# Patient Record
Sex: FEMALE | Race: WHITE | Employment: FULL TIME | URBAN - METROPOLITAN AREA
[De-identification: names, ages, dates, MRNs, and addresses within clinical notes are randomized per-mention and may not be internally consistent; named-entity substitution may affect disease eponyms.]

---

## 2022-12-01 ENCOUNTER — OFFICE VISIT (OUTPATIENT)
Dept: ORTHOPEDIC SURGERY | Age: 37
End: 2022-12-01

## 2022-12-01 VITALS — HEIGHT: 72 IN | WEIGHT: 180 LBS | BODY MASS INDEX: 24.38 KG/M2

## 2022-12-01 DIAGNOSIS — M21.852 ACQUIRED DYSPLASIA OF LEFT HIP: ICD-10-CM

## 2022-12-01 DIAGNOSIS — M25.852 HIP IMPINGEMENT SYNDROME, LEFT: ICD-10-CM

## 2022-12-01 DIAGNOSIS — S73.192A TEAR OF LEFT ACETABULAR LABRUM, INITIAL ENCOUNTER: ICD-10-CM

## 2022-12-01 DIAGNOSIS — R52 PAIN: Primary | ICD-10-CM

## 2022-12-01 NOTE — LETTER
[facility]  Surgery Scheduling Form:    22     DEMOGRAPHICS    Patient Name:  Rahul Lee  Patient :  1985   Patient SS#:  [id number]    Patient Phone:  [phone] (home) [phone] (work) Alt. Patient Phone:    Patient Address:  [place] [address]    PCP:  No primary care provider on file. Insurance:  Payor: BCBS / Plan: BCBS - [place] PPO / Product Type: *No Product type* /   Insurance ID Number:  Payer/Plan Subscr  Sex Relation Sub. Ins. ID Effective Group Num   1. [place] 1985 Male Father [id number] 1/1/15 [id number]                                   [address] [address]   2.  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.89 Adult developmental hip dysplasia    Operation: LEFT  51456 Hip Arthroscopy, labral repair  39756 Hip Arthroscopy, osteoplasty acetabulum     Provider:  Ayla Rangel MD    Location:  [facility] INFORMATION    Requested Date:  ***   Requested Time:  ***       Patient Arrival Time:  ***  OR Time Required:  60 Minutes  Admission:  [x]Outpatient   []23 hour  []Same Day Admit:    days  []Inpatient    Anesthesia:  [x]General  []Spinal  []MAC/Sedation  Regional Anesthesia:  []None  []Lumbar Plexus Block  [x]PENG []Femoral  []Adductor canal  []Interscalene Block  []Insert Catheter       EQUIPMENT    Position:  [x]Supine  []Lateral  []Beach-chair  []Prone    OR Bed:  []Regular  [x][place]  []Jose  []Hip gray  []Beach-chair  []Spyder  Radiology:  [x]Large C-arm  []Small C-arm  []Portable X-ray    Instrument Trays:  []General hip set  []Natacha special hip retractors  []KOKI set    Implants:  Edin-Biomet Hip:  []3.5 screws, 4.5 screws, 6.5/7.0 cannulated screws  []Anchors [x]NuCel  []Bone graft  Na Arthroscopy:  [x]Entry kit, cannulas, shaver  [x] 5.5 Desire Frisk  [x]Anchors                                        [x]NanoPass, Slingshot   [x]Wand    Open instruments: []5 mm round long [place] []Aquamantis    Labral Reconstruction: []Fascia Fariha Allograft  []Graft prep stand  KOKI: []Cortical allograft ilium      SUTURE: []#5 Ethibond  []#2 Ethibond  []#2 Quill  []#1 PDS  []#1 Vicryl                   []2-0 Vicryl  []3-0 Monocryl  [x]2-0 Nylon  []3-0 Nylon  []3-0 PDS                    []Dermabond  []Steri-strips (in half)  DRESSING:  []Prineo dermabond  [x]4x4 gauze  [x]ABDs  [x]Tegaderm  BRACE: []Pelvic Binder  []Hip X-ACT  []Knee TROM  []Knee immobilizer                 []Shoulder Immob. (w/abd. pillow)  []Sling  []Ice Unit  []Ace-Wrap                 [x]Crutches / Flakita Guzman      [x]Edin Biomet:  Jason Fat [phone], Drea Alicia. [email or web address]  []Medacta: Hermelindo Irvin [phone], [email or web address]  []Fx Shoulder: Andres Cortes [phone], Alejos Rinne. [email or web address]  [x]Na: Dionte Lomeli [phone], Cierra Ramirez. [email or web address]  []Kelley & Nephew: Jason Ram [phone]      Comments: ***      Adelaide Arteaga MD  [facility]  12/1/2022       4:09 PM EST

## 2022-12-01 NOTE — PROGRESS NOTES
Dr Adelaide Arteaga      Date /Time 12/1/2022       3:58 PM EST  Name Wilian Darling             1985   Location  [facility]  MRN [id number]                Chief Complaint   Patient presents with    Hip Pain     NP L HIP         History of Present Illness    Wilian Darling is a 39 y.o. female who presents with  left hip pain. Sent in consultation by Julian Yates (one of our previous patients). Injury Mechanism:  none. Worker's Comp. & legal issues:   none. Previous Treatments: Ice, Heat, and NSAIDs    Patient presents the office today with a new problem. Patient here with a chief complaint of left hip pain. Patient has had left hip pain for an extended amount of time. No specific injury or trauma. Pain concentrated over the anterior aspect of the hip. She has seen a surgeon at [address] [place] [address] [address] who recommended a femoroplasty and labral repair. She is here for second opinion. She has failed previous 2 injections to the hip. She is a stay-at-home mom. Her  is one of the directors at [employer]. She has failed all conservative treatments including therapy. She also reports using braces most likely for what appears to be hip dysplasia as a kid. She was happy sitting in the W position but was also able to get to a figure-of-four position easily. She remains flexible. Low risk for Beighton score though. Past History  No past medical history on file. No past surgical history on file. No family history on file. Social History     Tobacco Use    Smoking status: Never    Smokeless tobacco: Never   Substance Use Topics    Alcohol use: Yes      Current Outpatient Medications on File Prior to Visit   Medication Sig Dispense Refill    azithromycin (ZITHROMAX) 250 MG tablet       TAMIFLU 75 MG capsule       methylPREDNISolone (MEDROL DOSEPACK) 4 MG tablet Take as directed 21 tablet 0     No current facility-administered medications on file prior to visit.         ASCVD 10-YEAR RISK SCORE  The ASCVD Risk score (Dru AYALA, et al., 2019) failed to calculate for the following reasons: The 2019 ASCVD risk score is only valid for ages 36 to 78   . Review of Systems  10-point ROS is negative other than HPI. Physical Exam  Based off 1997 Exam Criteria  Ht 6' 2\" (1.88 m)   Wt 180 lb (81.6 kg)   BMI 23.11 kg/m²      Constitutional:       General: He is not in acute distress. Appearance: Normal appearance. Cardiovascular:      Rate and Rhythm: Normal rate and regular rhythm. Pulses: Normal pulses. Pulmonary:      Effort: Pulmonary effort is normal. No respiratory distress. Neurological:      Mental Status: He is alert and oriented to person, place, and time. Mental status is at baseline. Musculoskeletal:  Gait:  normal    Skin: Clean and intact.   No open sores or wounds    Lymphatics: No palpable lymph nodes    Spine / Hip Exam:      RIGHT  LEFT    Lumbar Spine Exam  [x] All Neg    [x] All Neg     Straight leg raise  []  []Not tested   []  []Not tested    Clonus  []  []Not tested   []  []Not tested    Pain with motion  []  []Not tested   []  []Not tested    Radiculopathy  []  []Not tested   []  []Not tested    Paraspinal muscle tenderness  [] Paraspinal  []Midline   [] Paraspinal  []Midline   Sensation RIGHT  LEFT    L3  [x] Normal []Decreased    [x] Normal []Decreased   L4  [x] Normal  []Decreased   [x] Normal []Decreased   L5  [x] Normal []Decreased   [x] Normal []Decreased   S1  [x] Normal  []Decreased   [x] Normal []Decreased   Pelvis       Scoliosis  [x] Nml  [] Present     Leg-length discrepency  [x] Equal  [] Right longer   [] Left longer   Range of Motion Active Passive Active Passive   Hip Flexion 120  120    Abduction 50  50    External Rotation @ 90 flex 65  65    Internal Rotation @ 90 flex 20  20           Hip Impingement / Dysplasia  [x] All Neg  [] Not tested   [] All Neg  [] Not tested    Hip impingement test  []  []Not tested   [x]  []Not tested    C-sign  [] []Not tested   [x]  []Not tested    Anterior instability apprehension  []  []Not tested   []  []Not tested    Posterior instability apprehension  []  []Not tested   []  []Not tested    Uncontained Internal rotation  []  []Not tested  []  []Not tested          Abductors  [x] All Neg  [] Not tested   [x] All Neg  [] Not tested    Medius strength  []  []Not tested   []  []Not tested    Minimum strength  []  []Not tested   []  []Not tested    IT band tendonitis  []  []Not tested   []  []Not tested    Trochanteric tenderness  []  []Not tested  []  []Not tested   Sciatic neuropathic pain  []  []Not tested   []  []Not tested           Post-arthroplasty  [] All Neg  [] Not tested   [] All Neg  [] Not tested    Rectus tendonitis  []  []Not tested   []  []Not tested    Iliopsoas tendonitis       Start-up pain  []  []Not tested   []  []Not tested      Her motion remains fairly well-balanced. Low on the Beighton score. Imaging    Left Hip: [facility]  Radiographs: X-rays were ordered and reviewed of the left hip.  3 views. AP pelvis, lateral, and false profile. They demonstrate relative retroversion of the acetabulum. Most likely presence of femoral anteversion with consistent finding present of the lateral femoral head neck offset. I do not believe this is evidence of a cam lesion. She does have small pincer pathology present however. Lateral center edge angle on the AP x-ray demonstrates 27 degrees, anterior center edge is 26 degrees. She does have a positive crossover sign and lateral wall sign indicating retroversion relatively. She also has ischial spine sign indicating the same thing. I have reviewed reports from previous doctor visits from the patient and will review this MRI done independently, the report as well as my own interpretation as part of this visit.       Procedure:  Orders Placed This Encounter   Procedures    XR HIP LEFT (2-3 VIEWS)     Standing Status:   Future     Number of Occurrences:   1     Standing Expiration Date:   11/30/2023       Assessment and Plan  Dirk Keith was seen today for hip pain. Diagnoses and all orders for this visit:    Pain  -     XR HIP LEFT (2-3 VIEWS); Future    Hip impingement syndrome, left    Tear of left acetabular labrum, initial encounter      Patient does have an MRI. Is not available for my review today. I would like to review the MRI but basically believe patient needs an acetabular osteoplasty and labral repair. She will obtain her MRI from [facility] and delivered to the office. I will review it and call the patient. I do not believe she needs a femoroplasty. I would consider periportal technique for her hip scope rather than full capsular exposure. I expressed that the [facility] surgeons are excellent at performing this operation if needed. I am here. I provide guidance if needed. I discussed with Mara Jenkins that her history, symptoms, signs, and imaging are most consistent with labral tear, femoro-acetabular impingement, and hip dysplasia    We reviewed the natural history of these conditions and treatment options ranging from conservative measures (rest, icing, activity modification, physical therapy, pain meds, cortisone injection)  to surgical options. We had a long discussion with the patient about their hip. We discussed surgical and non surgical options. The most important thing is to work to maintain their range of motion. Next they can try medications including tylenol and NSAIDs. They should also ice frequently and avoid activities that make their hip hurt. Cortisone injections are also options when medicine has failed. We finally discussed surgical options including arthroscopic debridement and possible repairs. They should put it off until they can no longer stand the pain and when nothing else has worked. Conservative measures have failed. She is not interested in cortisone injections.   I think she is an appropriate candidate for surgery due to her ongoing symptoms and dysfunction despite conservative measures. The procedure would be left  36479 Hip Arthroscopy, labral repair  44754 Hip Arthroscopy, osteoplasty acetabulum    Additional imaging needed: Need to review MRI which is already been completed. She will send this to us. Perioperative considerations include:  Preop PCP eval .    We reviewed the risks, benefits, alternatives of this approach. We discussed risks including, but not limited to, bleeding, pain, infection, scarring, damage to the neurovascular structures, blood clots, pulmonary embolus, stiffness, implant failure, incomplete relief of pain, and incomplete return of function. We also reviewed the surgical details, expected recovery, and rehabilitation (6-9 months). She expressed understanding and will undergo preoperative medical evaluation and optimization. If she chooses to undergo surgery with us due to the minor changes, we will plan for hopefully sometime in mid January at [facility]  She will send us the disc in the coming days and we will talk to her on the phone. We can go from there. Electronically signed by Iván Jordan MD on 12/1/2022 at 3:58 PM  This dictation was generated by voice recognition computer software. Although all attempts are made to edit the dictation for accuracy, there may be errors in the transcription that are not intended.

## 2023-01-10 ENCOUNTER — TELEPHONE (OUTPATIENT)
Dept: ORTHOPEDIC SURGERY | Age: 38
End: 2023-01-10

## 2023-01-10 ENCOUNTER — OFFICE VISIT (OUTPATIENT)
Dept: ORTHOPEDIC SURGERY | Age: 38
End: 2023-01-10
Payer: COMMERCIAL

## 2023-01-10 VITALS — BODY MASS INDEX: 24.38 KG/M2 | WEIGHT: 180 LBS | HEIGHT: 72 IN

## 2023-01-10 DIAGNOSIS — M25.852 HIP IMPINGEMENT SYNDROME, LEFT: ICD-10-CM

## 2023-01-10 DIAGNOSIS — S73.192A TEAR OF LEFT ACETABULAR LABRUM, INITIAL ENCOUNTER: ICD-10-CM

## 2023-01-10 DIAGNOSIS — M21.852 ACQUIRED DYSPLASIA OF LEFT HIP: ICD-10-CM

## 2023-01-10 DIAGNOSIS — Z01.818 PREOP TESTING: ICD-10-CM

## 2023-01-10 DIAGNOSIS — Z01.818 PREOP TESTING: Primary | ICD-10-CM

## 2023-01-10 LAB
ABO/RH: NORMAL
ALBUMIN SERPL-MCNC: 4.5 G/DL (ref 3.4–5)
ANION GAP SERPL CALCULATED.3IONS-SCNC: 15 MMOL/L (ref 3–16)
ANTIBODY SCREEN: NORMAL
APTT: 30 SEC (ref 23–34.3)
BASOPHILS ABSOLUTE: 0.1 K/UL (ref 0–0.2)
BASOPHILS RELATIVE PERCENT: 1 %
BILIRUBIN URINE: NEGATIVE
BLOOD, URINE: NEGATIVE
BUN BLDV-MCNC: 9 MG/DL (ref 7–20)
CALCIUM SERPL-MCNC: 10.7 MG/DL (ref 8.3–10.6)
CHLORIDE BLD-SCNC: 104 MMOL/L (ref 99–110)
CLARITY: CLEAR
CO2: 22 MMOL/L (ref 21–32)
COLOR: YELLOW
CREAT SERPL-MCNC: 0.7 MG/DL (ref 0.6–1.1)
EOSINOPHILS ABSOLUTE: 0.1 K/UL (ref 0–0.6)
EOSINOPHILS RELATIVE PERCENT: 1.4 %
GFR SERPL CREATININE-BSD FRML MDRD: >60 ML/MIN/{1.73_M2}
GLUCOSE BLD-MCNC: 96 MG/DL (ref 70–99)
GLUCOSE URINE: NEGATIVE MG/DL
HCT VFR BLD CALC: 41.5 % (ref 36–48)
HEMOGLOBIN: 13.9 G/DL (ref 12–16)
INR BLD: 0.97 (ref 0.87–1.14)
KETONES, URINE: NEGATIVE MG/DL
LEUKOCYTE ESTERASE, URINE: NEGATIVE
LYMPHOCYTES ABSOLUTE: 2.2 K/UL (ref 1–5.1)
LYMPHOCYTES RELATIVE PERCENT: 36.5 %
MCH RBC QN AUTO: 28.4 PG (ref 26–34)
MCHC RBC AUTO-ENTMCNC: 33.4 G/DL (ref 31–36)
MCV RBC AUTO: 85.2 FL (ref 80–100)
MICROSCOPIC EXAMINATION: NORMAL
MONOCYTES ABSOLUTE: 0.4 K/UL (ref 0–1.3)
MONOCYTES RELATIVE PERCENT: 6.3 %
NEUTROPHILS ABSOLUTE: 3.3 K/UL (ref 1.7–7.7)
NEUTROPHILS RELATIVE PERCENT: 54.8 %
NITRITE, URINE: NEGATIVE
PDW BLD-RTO: 12.5 % (ref 12.4–15.4)
PH UA: 6.5 (ref 5–8)
PLATELET # BLD: 274 K/UL (ref 135–450)
PMV BLD AUTO: 9.2 FL (ref 5–10.5)
POTASSIUM SERPL-SCNC: 4.1 MMOL/L (ref 3.5–5.1)
PROTEIN UA: NEGATIVE MG/DL
PROTHROMBIN TIME: 12.7 SEC (ref 11.7–14.5)
RBC # BLD: 4.87 M/UL (ref 4–5.2)
SODIUM BLD-SCNC: 141 MMOL/L (ref 136–145)
SPECIFIC GRAVITY UA: 1.01 (ref 1–1.03)
TRANSFERRIN: 254 MG/DL (ref 200–360)
URINE TYPE: NORMAL
UROBILINOGEN, URINE: 0.2 E.U./DL
WBC # BLD: 6.1 K/UL (ref 4–11)

## 2023-01-10 PROCEDURE — 99214 OFFICE O/P EST MOD 30 MIN: CPT | Performed by: ORTHOPAEDIC SURGERY

## 2023-01-10 NOTE — LETTER
[facility]  Surgery Scheduling Form:    01/10/23     DEMOGRAPHICS    Patient Name:  Arelis Johnston  Patient :  1985   Patient SS#:  [id number]    Patient Phone:  [phone] (home) [phone] (work) Alt. Patient Phone:    Patient Address:  [place] [address]    PCP:  No primary care provider on file. Insurance:  Payor: BCBS / Plan: [facility] - [place] PPO / Product Type: *No Product type* /   Insurance ID Number:  Payer/Plan Subscr  Sex Relation Sub. Ins. ID Effective Group Num   1.  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  S73.192A Acetabular labral tear    Operation: LEFT Hip   09136 Hip Arthroscopy, labral repair  91477 Hip Arthroscopy, osteoplasty acetabulum     Provider:  Robles Cardoso MD    Location:  [facility] INFORMATION    Requested Date:  2023   Requested Time:  TBD       Patient Arrival Time:  TBD  OR Time Required:  70 Minutes  Admission:  [x]Outpatient   []23 hour  []Same Day Admit:    days  []Inpatient    Anesthesia:  [x]General  []Spinal  []MAC/Sedation  Regional Anesthesia:  []None  []Lumbar Plexus Block  [x]PENG []Femoral  []Adductor canal  []Interscalene Block  []Insert Catheter       EQUIPMENT    Position:  [x]Supine  []Lateral  []Beach-chair  []Prone    OR Bed:  []Regular  [x][place]  []Jose  []Hip gray  []Beach-chair  []Spyder  Radiology:  [x]Large C-arm  []Small C-arm  []Portable X-ray    Instrument Trays:  []General hip set  []Natacha special hip retractors  []KOKI set    Implants:  Edin-Biomet Hip:  []3.5 screws, 4.5 screws, 6.5/7.0 cannulated screws  []Anchors                                      [x]NuCel  []Bone graft  [place] Arthroscopy:  [x]Entry kit, cannulas, shaver  [x] 5.5 Melissa Emmer  [x]Anchors [x]NanoPass, Slingshot   [x]Wand    Open instruments: []5 mm round long [place] []Aquamantis    Labral Reconstruction: []Fascia Fariha Allograft  []Graft prep stand  KOKI: []Cortical allograft ilium      SUTURE: []#5 Ethibond  []#2 Ethibond  []#2 Quill  []#1 PDS  []#1 Vicryl                   []2-0 Vicryl  []3-0 Monocryl  [x]2-0 Nylon  []3-0 Nylon  []3-0 PDS                    []Dermabond  []Steri-strips (in half)  DRESSING:  []Prineo dermabond  [x]4x4 gauze  [x]ABDs  [x]Tegaderm  BRACE: []Pelvic Binder  []Hip X-ACT  []Knee TROM  []Knee immobilizer                 []Shoulder Immob. (w/abd. pillow)  []Sling  []Ice Unit  []Ace-Wrap                 [x]Crutches / Sherl Maged      [x]Edin [employer]omet:  Leilani Spicer [phone], [email or web address]  []Medacta: Rollo Bijou [phone], [email or web address]  []Fx Shoulder: Linette Zuñiga [phone], Jamal Burnett. [email or web address]  [x][place]: Cara Nam [phone], Alli Gastelum. [email or web address]  []Kelley & Nephew: Karely Gan [phone]      Comments:        Darwin Stock MD  [facility]  1/10/2023       1:02 PM EST
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
## 2023-01-11 LAB
ESTIMATED AVERAGE GLUCOSE: 102.5 MG/DL
HBA1C MFR BLD: 5.2 %
URINE CULTURE, ROUTINE: NORMAL

## 2023-01-12 LAB — MRSA CULTURE ONLY: NORMAL

## 2023-02-01 ENCOUNTER — TELEPHONE (OUTPATIENT)
Dept: ORTHOPEDIC SURGERY | Age: 38
End: 2023-02-01

## 2023-02-01 NOTE — TELEPHONE ENCOUNTER
General Question     Subject: SX L HIP 2/2/23  Patient and /or Facility Request: Ana Laura Samuel  Contact Number: [phone]      PATIENT CALLED IN TO SEE IF SHE CAN SPEAK TO SOMEONE IN THE OFFICE REGARDING HER SX FOR HER L HIP TOMORROW. Magali Beavers PATIENT STATES THAT IT HAVE BEEN MOVED TO FEB 22.2023. .. PATIENT HAS  SET UP FOR TOMORROW WHILE SHE IN SX. .. PLEASE CALL PATIENT BACK AT THE ABOVE NUMBER. Magali Beavers

## 2023-02-01 NOTE — TELEPHONE ENCOUNTER
General Question     Subject: REQUESTING A CALL ON ARRIVAL TIME FOR SX.   Patient: Shawnee Bosworth Number: [phone]

## 2023-02-01 NOTE — TELEPHONE ENCOUNTER
General Question     Subject: Surgery Questions  Patient and /or Facility Request: Josefina Whalen Number: [phone]         Spouse(Claudio) calling regarding Sx information. Request Elizabeth Smith call him back at above tele#.

## 2023-02-06 RX ORDER — PREDNISONE 10 MG/1
TABLET ORAL
Status: ON HOLD | COMMUNITY
Start: 2022-11-30 | End: 2023-02-08 | Stop reason: HOSPADM

## 2023-02-06 RX ORDER — CODEINE PHOSPHATE AND GUAIFENESIN 10; 100 MG/5ML; MG/5ML
SOLUTION ORAL
COMMUNITY
Start: 2022-11-30

## 2023-02-06 RX ORDER — AMOXICILLIN 500 MG/1
CAPSULE ORAL
Status: ON HOLD | COMMUNITY
Start: 2023-01-04 | End: 2023-02-08 | Stop reason: HOSPADM

## 2023-02-06 NOTE — PROGRESS NOTES
Place patient label inside box (if no patient label, complete below)  Name:  :  MR#:     Pura Marie / PROCEDURE  I (we), Michelle [place] (Patient Name) authorize DR. Bhavani Sifuentes (Provider / Berta Shah) and/or such assistants as may be selected by him/her, to perform the following operation/procedure(s): LEFT HIP ARTHROSCOPY, LABRAL REPAIR, OSTEOPLASTY ACETABULUM        Note: If unable to obtain consent prior to an emergent procedure, document the emergent reason in the medical record. This procedure has been explained to my (our) satisfaction and included in the explanation was: The intended benefit, nature, and extent of the procedure to be performed; The significant risks involved and the probability of success; Alternative procedures and methods of treatment; The dangers and probable consequences of such alternatives (including no procedure or treatment); The expected consequences of the procedure on my future health; Whether other qualified individuals would be performing important surgical tasks and/or whether  would be present to advise or support the procedure. I (we) understand that there are other risks of infection and other serious complications in the pre-operative/procedural and postoperative/procedural stages of my (our) care. I (we) have asked all of the questions which I (we) thought were important in deciding whether or not to undergo treatment or diagnosis. These questions have been answered to my (our) satisfaction. I (we) understand that no assurance can be given that the procedure will be a success, and no guarantee or warranty of success has been given to me (us). It has been explained to me (us) that during the course of the operation/procedure, unforeseen conditions may be revealed that necessitate extension of the original procedure(s) or different procedure(s) than those set forth in Paragraph 1.  I (we) authorize and request that the above-named physician, his/her assistants or his/her designees, perform procedures as necessary and desirable if deemed to be in my (our) best interest.     Revised 8/2/2021                                                                          Page 1 of 2       I acknowledge that health care personnel may be observing this procedure for the purpose of medical education or other specified purposes as may be necessary as requested and/or approved by my (our) physician. I (we) consent to the disposal by the hospital Pathologist of the removed tissue, parts or organs in accordance with hospital policy. I do ____ do not ____ consent to the use of a local infiltration pain blocking agent that will be used by my provider/surgical provider to help alleviate pain during my procedure. I do ____ do not ____ consent to an emergent blood transfusion in the case of a life-threatening situation that requires blood components to be administered. This consent is valid for 24 hours from the beginning of the procedure. This patient does ____ or does not ____ currently have a DNR status/order. If DNR order is in place, obtain Addendum to the Surgical Consent for ALL Patients with a DNR Order to address dania-operative status for limited intervention or DNR suspension.      I have read and fully understand the above Consent for Operation/Procedure and that all blanks were completed before I signed the consent.   _____________________________       _____________________      ____/____am/pm  Signature of Patient or legal representative      Printed Name / Relationship            Date / Time   ____________________________       _____________________      ____/____am/pm  Witness to Signature                                    Printed Name                    Date / Time    If patient is unable to sign or is a minor, complete the following)  Patient is a minor, ____ years of age, or unable to sign because:   ______________________________________________________________________________________________    If a phone consent is obtained, consent will be documented by using two health care professionals, each affirming that the consenting party has no questions and gives consent for the procedure discussed with the physician/provider.   _____________________          ____________________       _____/_____am/pm   2nd witness to phone consent        Printed name           Date / Time    Informed Consent:  I have provided the explanation described above in section 1 to the patient and/or legal representative.  I have provided the patient and/or legal representative with an opportunity to ask any questions about the proposed operation/procedure.   ___________________________          ____________________         ____/____am/pm  Provider / Proceduralist                            Printed name            Date / Time  Revised 8/2/2021                                                                      Page 2 of 2

## 2023-02-07 ENCOUNTER — TELEPHONE (OUTPATIENT)
Dept: ORTHOPEDIC SURGERY | Age: 38
End: 2023-02-07

## 2023-02-07 ENCOUNTER — ANESTHESIA EVENT (OUTPATIENT)
Dept: OPERATING ROOM | Age: 38
End: 2023-02-07
Payer: COMMERCIAL

## 2023-02-07 NOTE — TELEPHONE ENCOUNTER
Surgery 02/08/2023 [facility] 2:00 pm   Arrival 11:30 am         LM plz call back to confirm  [phone]      Patient Confirmed      jm

## 2023-02-08 ENCOUNTER — HOSPITAL ENCOUNTER (OUTPATIENT)
Age: 38
Setting detail: OUTPATIENT SURGERY
Discharge: HOME | End: 2023-02-08
Attending: ORTHOPAEDIC SURGERY | Admitting: ORTHOPAEDIC SURGERY
Payer: COMMERCIAL

## 2023-02-08 ENCOUNTER — APPOINTMENT (OUTPATIENT)
Dept: GENERAL RADIOLOGY | Age: 38
End: 2023-02-08
Attending: ORTHOPAEDIC SURGERY
Payer: COMMERCIAL

## 2023-02-08 ENCOUNTER — ANESTHESIA (OUTPATIENT)
Dept: OPERATING ROOM | Age: 38
End: 2023-02-08
Payer: COMMERCIAL

## 2023-02-08 ENCOUNTER — TELEPHONE (OUTPATIENT)
Dept: ORTHOPEDIC SURGERY | Age: 38
End: 2023-02-08

## 2023-02-08 VITALS
HEIGHT: 72 IN | HEART RATE: 94 BPM | TEMPERATURE: 97.7 F | SYSTOLIC BLOOD PRESSURE: 120 MMHG | WEIGHT: 180 LBS | BODY MASS INDEX: 24.38 KG/M2 | OXYGEN SATURATION: 97 % | RESPIRATION RATE: 19 BRPM | DIASTOLIC BLOOD PRESSURE: 81 MMHG

## 2023-02-08 DIAGNOSIS — S73.192A TEAR OF LEFT ACETABULAR LABRUM, INITIAL ENCOUNTER: Primary | ICD-10-CM

## 2023-02-08 LAB — PREGNANCY, URINE: NEGATIVE

## 2023-02-08 PROCEDURE — 84703 CHORIONIC GONADOTROPIN ASSAY: CPT

## 2023-02-08 PROCEDURE — 7100000000 HC PACU RECOVERY - FIRST 15 MIN: Performed by: ORTHOPAEDIC SURGERY

## 2023-02-08 PROCEDURE — 3700000001 HC ADD 15 MINUTES (ANESTHESIA): Performed by: ORTHOPAEDIC SURGERY

## 2023-02-08 PROCEDURE — 6370000000 HC RX 637 (ALT 250 FOR IP): Performed by: ORTHOPAEDIC SURGERY

## 2023-02-08 PROCEDURE — 73502 X-RAY EXAM HIP UNI 2-3 VIEWS: CPT

## 2023-02-08 PROCEDURE — 2500000003 HC RX 250 WO HCPCS: Performed by: NURSE ANESTHETIST, CERTIFIED REGISTERED

## 2023-02-08 PROCEDURE — 3209999900 FLUORO FOR SURGICAL PROCEDURES

## 2023-02-08 PROCEDURE — 7100000011 HC PHASE II RECOVERY - ADDTL 15 MIN: Performed by: ORTHOPAEDIC SURGERY

## 2023-02-08 PROCEDURE — C1713 ANCHOR/SCREW BN/BN,TIS/BN: HCPCS | Performed by: ORTHOPAEDIC SURGERY

## 2023-02-08 PROCEDURE — 6360000002 HC RX W HCPCS: Performed by: PHYSICIAN ASSISTANT

## 2023-02-08 PROCEDURE — 6360000002 HC RX W HCPCS: Performed by: NURSE ANESTHETIST, CERTIFIED REGISTERED

## 2023-02-08 PROCEDURE — 2709999900 HC NON-CHARGEABLE SUPPLY: Performed by: ORTHOPAEDIC SURGERY

## 2023-02-08 PROCEDURE — 7100000010 HC PHASE II RECOVERY - FIRST 15 MIN: Performed by: ORTHOPAEDIC SURGERY

## 2023-02-08 PROCEDURE — 3600000014 HC SURGERY LEVEL 4 ADDTL 15MIN: Performed by: ORTHOPAEDIC SURGERY

## 2023-02-08 PROCEDURE — 2580000003 HC RX 258: Performed by: ANESTHESIOLOGY

## 2023-02-08 PROCEDURE — 3600000004 HC SURGERY LEVEL 4 BASE: Performed by: ORTHOPAEDIC SURGERY

## 2023-02-08 PROCEDURE — 2720000010 HC SURG SUPPLY STERILE: Performed by: ORTHOPAEDIC SURGERY

## 2023-02-08 PROCEDURE — 64447 NJX AA&/STRD FEMORAL NRV IMG: CPT | Performed by: ANESTHESIOLOGY

## 2023-02-08 PROCEDURE — C1889 IMPLANT/INSERT DEVICE, NOC: HCPCS | Performed by: ORTHOPAEDIC SURGERY

## 2023-02-08 PROCEDURE — 6360000002 HC RX W HCPCS: Performed by: ANESTHESIOLOGY

## 2023-02-08 PROCEDURE — 7100000001 HC PACU RECOVERY - ADDTL 15 MIN: Performed by: ORTHOPAEDIC SURGERY

## 2023-02-08 PROCEDURE — 2580000003 HC RX 258: Performed by: PHYSICIAN ASSISTANT

## 2023-02-08 PROCEDURE — 2580000003 HC RX 258: Performed by: NURSE ANESTHETIST, CERTIFIED REGISTERED

## 2023-02-08 PROCEDURE — 3700000000 HC ANESTHESIA ATTENDED CARE: Performed by: ORTHOPAEDIC SURGERY

## 2023-02-08 DEVICE — NANOTACK SUTURE ANCHOR 1.4MM WITH FLEX INSERTER
Type: IMPLANTABLE DEVICE | Site: HIP | Status: FUNCTIONAL
Brand: NANOTACK

## 2023-02-08 DEVICE — GRAFT HUM TISS 2CC PLCNTA MTRX FLOWABLE IMMUNOSUPPRESSIVE W/: Type: IMPLANTABLE DEVICE | Site: HIP | Status: FUNCTIONAL

## 2023-02-08 RX ORDER — LABETALOL HYDROCHLORIDE 5 MG/ML
10 INJECTION, SOLUTION INTRAVENOUS
Status: DISCONTINUED | OUTPATIENT
Start: 2023-02-08 | End: 2023-02-08 | Stop reason: HOSPADM

## 2023-02-08 RX ORDER — SODIUM CHLORIDE 9 MG/ML
25 INJECTION, SOLUTION INTRAVENOUS PRN
Status: DISCONTINUED | OUTPATIENT
Start: 2023-02-08 | End: 2023-02-08 | Stop reason: HOSPADM

## 2023-02-08 RX ORDER — SODIUM CHLORIDE 0.9 % (FLUSH) 0.9 %
5-40 SYRINGE (ML) INJECTION PRN
Status: DISCONTINUED | OUTPATIENT
Start: 2023-02-08 | End: 2023-02-08 | Stop reason: HOSPADM

## 2023-02-08 RX ORDER — SODIUM CHLORIDE 0.9 % (FLUSH) 0.9 %
5-40 SYRINGE (ML) INJECTION EVERY 12 HOURS SCHEDULED
Status: DISCONTINUED | OUTPATIENT
Start: 2023-02-08 | End: 2023-02-08 | Stop reason: HOSPADM

## 2023-02-08 RX ORDER — SODIUM CHLORIDE, SODIUM LACTATE, POTASSIUM CHLORIDE, CALCIUM CHLORIDE 600; 310; 30; 20 MG/100ML; MG/100ML; MG/100ML; MG/100ML
INJECTION, SOLUTION INTRAVENOUS CONTINUOUS
Status: DISCONTINUED | OUTPATIENT
Start: 2023-02-08 | End: 2023-02-08 | Stop reason: HOSPADM

## 2023-02-08 RX ORDER — MIDAZOLAM HYDROCHLORIDE 1 MG/ML
2 INJECTION INTRAMUSCULAR; INTRAVENOUS ONCE
Status: COMPLETED | OUTPATIENT
Start: 2023-02-08 | End: 2023-02-08

## 2023-02-08 RX ORDER — METOCLOPRAMIDE HYDROCHLORIDE 5 MG/ML
10 INJECTION INTRAMUSCULAR; INTRAVENOUS
Status: DISCONTINUED | OUTPATIENT
Start: 2023-02-08 | End: 2023-02-08 | Stop reason: HOSPADM

## 2023-02-08 RX ORDER — METHYLPREDNISOLONE 4 MG/1
TABLET ORAL
Qty: 1 KIT | Refills: 0 | Status: SHIPPED | OUTPATIENT
Start: 2023-02-08

## 2023-02-08 RX ORDER — ROPIVACAINE HYDROCHLORIDE 5 MG/ML
INJECTION, SOLUTION EPIDURAL; INFILTRATION; PERINEURAL
Status: COMPLETED | OUTPATIENT
Start: 2023-02-08 | End: 2023-02-08

## 2023-02-08 RX ORDER — SUCCINYLCHOLINE/SOD CL,ISO/PF 200MG/10ML
SYRINGE (ML) INTRAVENOUS PRN
Status: DISCONTINUED | OUTPATIENT
Start: 2023-02-08 | End: 2023-02-08 | Stop reason: SDUPTHER

## 2023-02-08 RX ORDER — GLYCOPYRROLATE 0.2 MG/ML
INJECTION INTRAMUSCULAR; INTRAVENOUS PRN
Status: DISCONTINUED | OUTPATIENT
Start: 2023-02-08 | End: 2023-02-08 | Stop reason: SDUPTHER

## 2023-02-08 RX ORDER — FENTANYL CITRATE 50 UG/ML
100 INJECTION, SOLUTION INTRAMUSCULAR; INTRAVENOUS ONCE
Status: COMPLETED | OUTPATIENT
Start: 2023-02-08 | End: 2023-02-08

## 2023-02-08 RX ORDER — ROCURONIUM BROMIDE 10 MG/ML
INJECTION, SOLUTION INTRAVENOUS PRN
Status: DISCONTINUED | OUTPATIENT
Start: 2023-02-08 | End: 2023-02-08 | Stop reason: SDUPTHER

## 2023-02-08 RX ORDER — MELOXICAM 15 MG/1
15 TABLET ORAL DAILY
Qty: 30 TABLET | Refills: 0 | Status: SHIPPED | OUTPATIENT
Start: 2023-02-08

## 2023-02-08 RX ORDER — LIDOCAINE HYDROCHLORIDE 20 MG/ML
INJECTION, SOLUTION INTRAVENOUS PRN
Status: DISCONTINUED | OUTPATIENT
Start: 2023-02-08 | End: 2023-02-08 | Stop reason: SDUPTHER

## 2023-02-08 RX ORDER — ONDANSETRON 4 MG/1
4 TABLET, FILM COATED ORAL 3 TIMES DAILY PRN
Qty: 15 TABLET | Refills: 0 | Status: SHIPPED | OUTPATIENT
Start: 2023-02-08

## 2023-02-08 RX ORDER — ROPIVACAINE HYDROCHLORIDE 5 MG/ML
30 INJECTION, SOLUTION EPIDURAL; INFILTRATION; PERINEURAL ONCE
Status: DISCONTINUED | OUTPATIENT
Start: 2023-02-08 | End: 2023-02-08 | Stop reason: HOSPADM

## 2023-02-08 RX ORDER — HYDRALAZINE HYDROCHLORIDE 20 MG/ML
10 INJECTION INTRAMUSCULAR; INTRAVENOUS
Status: DISCONTINUED | OUTPATIENT
Start: 2023-02-08 | End: 2023-02-08 | Stop reason: HOSPADM

## 2023-02-08 RX ORDER — OXYCODONE HYDROCHLORIDE 5 MG/1
5 TABLET ORAL ONCE
Status: COMPLETED | OUTPATIENT
Start: 2023-02-08 | End: 2023-02-08

## 2023-02-08 RX ORDER — MEPERIDINE HYDROCHLORIDE 25 MG/ML
12.5 INJECTION INTRAMUSCULAR; INTRAVENOUS; SUBCUTANEOUS EVERY 5 MIN PRN
Status: DISCONTINUED | OUTPATIENT
Start: 2023-02-08 | End: 2023-02-08 | Stop reason: HOSPADM

## 2023-02-08 RX ORDER — DIPHENHYDRAMINE HYDROCHLORIDE 50 MG/ML
12.5 INJECTION INTRAMUSCULAR; INTRAVENOUS
Status: DISCONTINUED | OUTPATIENT
Start: 2023-02-08 | End: 2023-02-08 | Stop reason: HOSPADM

## 2023-02-08 RX ORDER — HYDROMORPHONE HCL 110MG/55ML
PATIENT CONTROLLED ANALGESIA SYRINGE INTRAVENOUS PRN
Status: DISCONTINUED | OUTPATIENT
Start: 2023-02-08 | End: 2023-02-08 | Stop reason: SDUPTHER

## 2023-02-08 RX ORDER — DEXAMETHASONE SODIUM PHOSPHATE 4 MG/ML
INJECTION, SOLUTION INTRA-ARTICULAR; INTRALESIONAL; INTRAMUSCULAR; INTRAVENOUS; SOFT TISSUE PRN
Status: DISCONTINUED | OUTPATIENT
Start: 2023-02-08 | End: 2023-02-08 | Stop reason: SDUPTHER

## 2023-02-08 RX ORDER — ASPIRIN 81 MG/1
81 TABLET ORAL 2 TIMES DAILY
Qty: 60 TABLET | Refills: 0 | Status: SHIPPED | OUTPATIENT
Start: 2023-02-09

## 2023-02-08 RX ORDER — CYCLOBENZAPRINE HCL 10 MG
10 TABLET ORAL 3 TIMES DAILY PRN
Qty: 30 TABLET | Refills: 0 | Status: SHIPPED | OUTPATIENT
Start: 2023-02-08 | End: 2023-02-18

## 2023-02-08 RX ORDER — SODIUM CHLORIDE 9 MG/ML
INJECTION, SOLUTION INTRAVENOUS PRN
Status: DISCONTINUED | OUTPATIENT
Start: 2023-02-08 | End: 2023-02-08 | Stop reason: HOSPADM

## 2023-02-08 RX ORDER — PROPOFOL 10 MG/ML
INJECTION, EMULSION INTRAVENOUS PRN
Status: DISCONTINUED | OUTPATIENT
Start: 2023-02-08 | End: 2023-02-08 | Stop reason: SDUPTHER

## 2023-02-08 RX ORDER — ONDANSETRON 2 MG/ML
INJECTION INTRAMUSCULAR; INTRAVENOUS PRN
Status: DISCONTINUED | OUTPATIENT
Start: 2023-02-08 | End: 2023-02-08 | Stop reason: SDUPTHER

## 2023-02-08 RX ORDER — OXYCODONE HYDROCHLORIDE 5 MG/1
5 TABLET ORAL EVERY 6 HOURS PRN
Qty: 28 TABLET | Refills: 0 | Status: SHIPPED | OUTPATIENT
Start: 2023-02-08 | End: 2023-02-15

## 2023-02-08 RX ORDER — KETAMINE HCL IN NACL, ISO-OSM 20 MG/2 ML
SYRINGE (ML) INJECTION PRN
Status: DISCONTINUED | OUTPATIENT
Start: 2023-02-08 | End: 2023-02-08 | Stop reason: SDUPTHER

## 2023-02-08 RX ADMIN — MIDAZOLAM HYDROCHLORIDE 2 MG: 2 INJECTION, SOLUTION INTRAMUSCULAR; INTRAVENOUS at 12:22

## 2023-02-08 RX ADMIN — ONDANSETRON 4 MG: 2 INJECTION INTRAMUSCULAR; INTRAVENOUS at 16:27

## 2023-02-08 RX ADMIN — SUGAMMADEX 200 MG: 100 INJECTION, SOLUTION INTRAVENOUS at 16:30

## 2023-02-08 RX ADMIN — DEXAMETHASONE SODIUM PHOSPHATE 8 MG: 4 INJECTION, SOLUTION INTRAMUSCULAR; INTRAVENOUS at 14:44

## 2023-02-08 RX ADMIN — LIDOCAINE HYDROCHLORIDE 100 MG: 20 INJECTION, SOLUTION INTRAVENOUS at 14:28

## 2023-02-08 RX ADMIN — ROCURONIUM BROMIDE 5 MG: 10 INJECTION INTRAVENOUS at 14:28

## 2023-02-08 RX ADMIN — PHENYLEPHRINE HYDROCHLORIDE 15 MCG/MIN: 10 INJECTION INTRAVENOUS at 15:54

## 2023-02-08 RX ADMIN — SODIUM CHLORIDE, SODIUM LACTATE, POTASSIUM CHLORIDE, AND CALCIUM CHLORIDE: .6; .31; .03; .02 INJECTION, SOLUTION INTRAVENOUS at 15:08

## 2023-02-08 RX ADMIN — ROPIVACAINE HYDROCHLORIDE 30 ML: 5 INJECTION, SOLUTION EPIDURAL; INFILTRATION; PERINEURAL at 12:26

## 2023-02-08 RX ADMIN — FENTANYL CITRATE 100 MCG: 50 INJECTION INTRAMUSCULAR; INTRAVENOUS at 15:05

## 2023-02-08 RX ADMIN — CEFAZOLIN 2000 MG: 2 INJECTION, POWDER, FOR SOLUTION INTRAMUSCULAR; INTRAVENOUS at 14:19

## 2023-02-08 RX ADMIN — HYDROMORPHONE HYDROCHLORIDE 2 MG: 2 INJECTION, SOLUTION INTRAMUSCULAR; INTRAVENOUS; SUBCUTANEOUS at 16:30

## 2023-02-08 RX ADMIN — GLYCOPYRROLATE 0.2 MG: 0.2 INJECTION INTRAMUSCULAR; INTRAVENOUS at 14:50

## 2023-02-08 RX ADMIN — FENTANYL CITRATE 100 MCG: 50 INJECTION INTRAMUSCULAR; INTRAVENOUS at 12:22

## 2023-02-08 RX ADMIN — PROPOFOL 200 MG: 10 INJECTION, EMULSION INTRAVENOUS at 14:28

## 2023-02-08 RX ADMIN — Medication 140 MG: at 14:29

## 2023-02-08 RX ADMIN — ROCURONIUM BROMIDE 45 MG: 10 INJECTION INTRAVENOUS at 15:05

## 2023-02-08 RX ADMIN — OXYCODONE 5 MG: 5 TABLET ORAL at 18:27

## 2023-02-08 RX ADMIN — Medication 20 MG: at 15:08

## 2023-02-08 RX ADMIN — PHENYLEPHRINE HYDROCHLORIDE 100 MCG: 10 INJECTION, SOLUTION INTRAMUSCULAR; INTRAVENOUS; SUBCUTANEOUS at 15:43

## 2023-02-08 RX ADMIN — SODIUM CHLORIDE, SODIUM LACTATE, POTASSIUM CHLORIDE, AND CALCIUM CHLORIDE: .6; .31; .03; .02 INJECTION, SOLUTION INTRAVENOUS at 13:15

## 2023-02-08 ASSESSMENT — PAIN DESCRIPTION - DESCRIPTORS
DESCRIPTORS: ACHING

## 2023-02-08 ASSESSMENT — PAIN DESCRIPTION - ORIENTATION
ORIENTATION: LEFT
ORIENTATION: LEFT

## 2023-02-08 ASSESSMENT — PAIN SCALES - GENERAL
PAINLEVEL_OUTOF10: 3
PAINLEVEL_OUTOF10: 4
PAINLEVEL_OUTOF10: 0
PAINLEVEL_OUTOF10: 0

## 2023-02-08 ASSESSMENT — PAIN DESCRIPTION - PAIN TYPE
TYPE: SURGICAL PAIN
TYPE: SURGICAL PAIN

## 2023-02-08 ASSESSMENT — PAIN DESCRIPTION - LOCATION
LOCATION: HIP
LOCATION: HIP

## 2023-02-08 ASSESSMENT — PAIN - FUNCTIONAL ASSESSMENT: PAIN_FUNCTIONAL_ASSESSMENT: 0-10

## 2023-02-08 NOTE — ANESTHESIA POSTPROCEDURE EVALUATION
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     Mata Phase I: Mata Score: 9    Mata Phase II:        Anesthesia Post Evaluation    Patient location during evaluation: PACU  Patient participation: complete - patient participated  Level of consciousness: awake and alert  Airway patency: patent  Nausea & Vomiting: no nausea and no vomiting  Complications: no  Cardiovascular status: hemodynamically stable  Respiratory status: acceptable  Hydration status: euvolemic  Multimodal analgesia pain management approach
18

## 2023-02-08 NOTE — H&P
Update History & Physical     The patient's History and Physical of 1/24/2023 was reviewed with the patient and I examined the patient. There was no change. The surgical site was confirmed by the patient and me. Plan: The risks, benefits, expected outcome, and alternative to the recommended procedure have been discussed with the patient / family. Patient understands and wants to proceed with the procedure.       Electronically signed by Fiona Velez MD on 2/8/2023 at 12:53 PM

## 2023-02-08 NOTE — PROGRESS NOTES
Instructions given to mother who verbalized understanding,  Mother already picked up prescriptions in [facility] outpatient pharmacy.

## 2023-02-08 NOTE — PROGRESS NOTES
From OR arousable but very drowsy, denies any pain at this time, dressing CDI, ice pack applied, VSS. Report from [facility] and [facility].     S/P LEFT HIP ARTHROSCOPY LABRAL REPAIR

## 2023-02-08 NOTE — ANESTHESIA PROCEDURE NOTES
Peripheral Block    Patient location during procedure: pre-op  Reason for block: post-op pain management and at surgeon's request  Start time: 2/8/2023 12:21 PM  End time: 2/8/2023 12:28 PM  Staffing  Performed: anesthesiologist   Anesthesiologist: Ale Christian MD  Preanesthetic Checklist  Completed: patient identified, IV checked, site marked, risks and benefits discussed, surgical/procedural consents, equipment checked, pre-op evaluation, timeout performed, anesthesia consent given, oxygen available and monitors applied/VS acknowledged  Peripheral Block   Patient position: supine  Prep: ChloraPrep  Provider prep: mask and sterile gloves  Patient monitoring: cardiac monitor, continuous pulse ox, continuous capnometry, frequent blood pressure checks, IV access, oxygen and responsive to questions  Block type: PENG  Laterality: left  Injection technique: single-shot  Guidance: ultrasound guided    Needle   Needle type: insulated echogenic nerve stimulator needle   Needle gauge: 22 G  Needle length: 8 cm  Assessment   Injection assessment: negative aspiration for heme, no paresthesia on injection and no intravascular symptoms  Paresthesia pain: none  Slow fractionated injection: yes  Hemodynamics: stable  Real-time US image taken/store: yes  Outcomes: uncomplicated and patient tolerated procedure well    Additional Notes  Ultrasound-Guided Left PENG Block Note     Indication: Postoperative analgesia upon request of the attending surgeon. Procedure: Informed consent obtained and timeout procedure performed. Patient supine, landmarks identified, sterile prep. Under direct ultrasound visualization a 22G 80mm insulated regional block needle was inserted medial to the anterior superior iliac spine and directed in a medial fashion toward the psoas tendon. Ropivacaine 0.5%-30cc was injected around the psoas tendon in 5cc increments as a PENG block.   Block tolerated well; there were no apparent complications at the time of the procedure. Medications Administered  ropivacaine (NAROPIN) injection 0.5% - Perineural   30 mL - 2/8/2023 12:26:00 PM      Luis Perez.  Jori Yates MD  February 8, 2023 12:37 PM

## 2023-02-08 NOTE — OP NOTE
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 Item Serial No.  Lot No. LRB No. Used Action   GRAFT HUM TISS 2CC PLCNTA MTRX FLOWABLE IMMUNOSUPPRESSIVE W/ - [id number]  GRAFT HUM TISS 2CC PLCNTA MTRX FLOWABLE IMMUNOSUPPRESSIVE W/ [id number] [facility] [employer] [facility]- [id number] Left 1 Implanted       Findings:  1. Displaced labral tear, measuring 40 mm in the heath-superior region of the acetabulum. 2.  No evidence of bony impingement, did not identify an overarching pincer lesion  3. Large chondral wave sign present on the acetabulum with grade 2 to grade 3 chondral damage at the superior lateral rim  4. A very large tear of the labrum present, near total labral injury, greater than 50% of the socket itself. Indications: Kar Coyne has left hip labral tear. She has a slightly retroverted acetabulum but is quite active. She was found to have a labral tear along with bony symptoms that were consistent with impingement. But she also had an element of microinstability and femoral dysplasia. We had a lengthy discussion and work-up prior to surgery.   I believe that because of her microinstability that a full capsular exposure and femoroplasty was really not necessary. I felt the periportal technique was reasonable and repairing the labrum itself is large tear was suspected. She failed physical therapy, over-the-counter medications, cortisone injections. She had significant discomfort in her activities of daily living. Therefore she elected to undergo surgical repair. She understood the risk benefits and alternatives of wanted to proceed. Procedure Details:  I marked the LEFT hip as the operative site in the preoperative area. Regional anesthesia was then induced without complications by anesthesia providers. Patient was wheeled back to the operating theater laid supine on the table. General anesthesia was induced without problems. Patient was transferred to the formerly [facility] table. Large foam post was placed in the groin. The feet were securely fastened to the boots. All bony prominences well-padded. Operative side arm was secured across the body. Timeout was performed confirming the site and type of surgery. Appropriate antibiotics were given within 60 minutes of incision. We began the procedure. Diagnostic Hip Arthroscopy  I first dialed out traction. Overall time of traction was 70 minutes. I cannulated into the joint at Anterolateral portal using a spinal needle followed by Nitinol wire then sequential dilation. Camera was then inserted after cannula. Then I triangulated another spinal needle at Mid-anterio-lateral portal into the anterior triangle through the capsule. Fluoroscopic guidance here was used. I dilated once more over Nitinol wire to get my second portal in position. From here arthroscopic instruments were used and water insufflation was turned on. Water pump pressure was kept at around 35 to 40 mmHg. I first performed a diagnostic arthroscopy. The above findings were noted.  From here we used an oscillating shaver to debride off the labral edge as well as the chondral damage. I then used a high-frequency ablator device to tease off the superior capsule above the labrum. Only a small periportal window was created around each portal, just the width of the cannula itself. Labral repair  I created a small bony bed healthy enough to heal the labrum. A very large tear of the labrum was identified. There was a large labrum itself, with significant delamination into the cartilage. A large chondral flap was contiguous with the labrum and was  away. Nanotak anchors were used. A series of anchors were inserted. A sharp [place] passer was used to pass 1 limb of the suture through the labrum itself. The labral fragment was quite large as already mentioned. This was well stabilized using 4 anchors and the steps were repeated as such. We had to switch camera portals to permit anchor placement via just the periportal technique. Assessment, amniotic fluid injection, closure  I then let traction down and confirmed congruent arc of the femoral head. I then used fluoroscopic guidance once more to inject amniotic fluid into the anterior capsule and intra-articularly. This will help to reduce scarring which would otherwise occur after an operation such as this. Only a small anterior capsulotomy had been performed therefore no repair was required. I closed the incisional portals with interrupted 2-0 nylon sutures. Nonadhesive dressing was then applied. Patient was reversed from general anesthesia transferred back to the PACU without complications. All counts correct x2. I was present throughout the entire to the case. Modifier 22: Increased time and complexity  This surgery took 50% more time than the standard labral repair. The size of this labral tear was quite large as it exceeded 3 cm. It was greater than 50% of the socket itself. Significant microinstability was suspected prior to surgery, therefore.   Portal technique without actual capsule exposure was performed, which is significantly more challenging and less invasive compared to the usual hip arthroscopy exposure, especially in setting of a large tear. This, in no way, signifies that the ultimate outcome is compromised in any way.       Postoperative Plan:  50% WB for 2 weeks with support device  WBAT after 2 weeks  Aspirin 81 mg bid  Ice man unit + CPM machine use x 3 weeks  Oxy outpatient if needed  F/u 2 weeks    Electronically signed by Luis Villaseñor MD on 2/8/2023 at 5:36 PM

## 2023-02-08 NOTE — ANESTHESIA PRE PROCEDURE
Department of Anesthesiology  Preprocedure Note       Name:  Chance Iniguez   Age:  44 y.o.  :  1985                                          MRN:  [id number]         Date:  2023      Surgeon: Ginna Su): Susan Manuel MD    Procedure: Procedure(s):  LEFT HIP ARTHROSCOPY, LABRAL REPAIR, OSTEOPLASTY ACETABULUM    Medications prior to admission:   Prior to Admission medications    Medication Sig Start Date End Date Taking? Authorizing Provider   aspirin EC 81 MG EC tablet Take 1 tablet by mouth 2 times daily 23  Yes Ascencion Hendricks PA-C   cyclobenzaprine (FLEXERIL) 10 MG tablet Take 1 tablet by mouth 3 times daily as needed for Muscle spasms 23 Yes Ascencion Hendricks PA-C   methylPREDNISolone (MEDROL, JOAN,) 4 MG tablet Take by mouth. 23  Yes Ascencion Hendricks PA-C   meloxicam (MOBIC) 15 MG tablet Take 1 tablet by mouth daily Start after medrol completed 23  Yes Ascencion Hendricks PA-C   ondansetron [facility]) 4 MG tablet Take 1 tablet by mouth 3 times daily as needed for Nausea or Vomiting 23  Yes Ascencion Hendricks PA-C   oxyCODONE (ROXICODONE) 5 MG immediate release tablet Take 1 tablet by mouth every 6 hours as needed for Pain for up to 7 days. Intended supply: 7 days.  Take lowest dose possible to manage pain Max Daily Amount: 20 mg 2/8/23 2/15/23 Yes Ascencion Hendricks PA-C   guaiFENesin-codeine (GUAIFENESIN AC) 100-10 MG/5ML liquid  22   Historical Provider, MD       Current medications:    Current Facility-Administered Medications   Medication Dose Route Frequency Provider Last Rate Last Admin    lactated ringers IV soln infusion   IntraVENous Continuous Rebecca Moffett MD        sodium chloride flush 0.9 % injection 5-40 mL  5-40 mL IntraVENous 2 times per day Ascencion Hendricks PA-C        sodium chloride flush 0.9 % injection 5-40 mL  5-40 mL IntraVENous PRN Ascencion Hendricks PA-C        0.9 % sodium chloride infusion   IntraVENous PRN Ascencion Hendricks PA-C        ceFAZolin (ANCEF) 2,000 mg in sodium chloride 0.9 % 50 mL IVPB (mini-bag)  2,000 mg IntraVENous On Call to [address] [employer] [facility]        midazolam (VERSED) injection 2 mg  2 mg IntraVENous Once Yudith Smith MD        fentaNYL (SUBLIMAZE) injection 100 mcg  100 mcg IntraVENous Once Yudith Smith MD        ropivacaine (NAROPIN) 0.5% injection 30 mL  30 mL Infiltration Once Yudith Smith MD           Allergies:  No Known Allergies    Problem List:    Patient Active Problem List   Diagnosis Code    Labral tear of left hip joint N98.543U       Past Medical History:  History reviewed. No pertinent past medical history. Past Surgical History:  History reviewed. No pertinent surgical history. Social History:    Social History     Tobacco Use    Smoking status: Never    Smokeless tobacco: Never   Substance Use Topics    Alcohol use: Yes     Comment: 1                                Counseling given: Not Answered      Vital Signs (Current):   Vitals:    02/06/23 1239 02/08/23 1116   BP:  122/79   Pulse:  58   Resp:  16   Temp:  98.3 °F (36.8 °C)   TempSrc:  Temporal   SpO2:  100%   Weight: 180 lb (81.6 kg) 180 lb (81.6 kg)   Height: 6' 2\" (1.88 m) 6' 2\" (1.88 m)                                              BP Readings from Last 3 Encounters:   02/08/23 122/79   01/16/15 125/65       NPO Status: Time of last liquid consumption: 2100                        Time of last solid consumption: 2100                        Date of last liquid consumption: 02/07/23                        Date of last solid food consumption: 02/07/23    BMI:   Wt Readings from Last 3 Encounters:   02/08/23 180 lb (81.6 kg)   01/10/23 180 lb (81.6 kg)   12/01/22 180 lb (81.6 kg)     Body mass index is 23.11 kg/m².     CBC:   Lab Results   Component Value Date/Time    WBC 6.1 01/10/2023 11:24 AM    RBC 4.87 01/10/2023 11:24 AM    HGB 13.9 01/10/2023 11:24 AM    HCT 41.5 01/10/2023 11:24 AM    MCV 85.2 01/10/2023 11:24 AM    RDW 12.5 01/10/2023 11:24 AM  01/10/2023 11:24 AM       CMP:   Lab Results   Component Value Date/Time     01/10/2023 11:24 AM    K 4.1 01/10/2023 11:24 AM     01/10/2023 11:24 AM    CO2 22 01/10/2023 11:24 AM    BUN 9 01/10/2023 11:24 AM    CREATININE 0.7 01/10/2023 11:24 AM    LABGLOM >60 01/10/2023 11:24 AM    GLUCOSE 96 01/10/2023 11:24 AM    CALCIUM 10.7 01/10/2023 11:24 AM       POC Tests: No results for input(s): POCGLU, POCNA, POCK, POCCL, POCBUN, POCHEMO, POCHCT in the last 72 hours. Coags:   Lab Results   Component Value Date/Time    PROTIME 12.7 01/10/2023 11:24 AM    INR 0.97 01/10/2023 11:24 AM    APTT 30.0 01/10/2023 11:24 AM       HCG (If Applicable):   Lab Results   Component Value Date    PREGTESTUR Negative 02/08/2023        ABGs: No results found for: PHART, PO2ART, DLX0WQO, UHE7SKG, BEART, L1FPPIPC     Type & Screen (If Applicable):  No results found for: LABABO, LABRH    Drug/Infectious Status (If Applicable):  No results found for: HIV, HEPCAB    COVID-19 Screening (If Applicable): No results found for: COVID19        Anesthesia Evaluation  Patient summary reviewed and Nursing notes reviewed no history of anesthetic complications:   Airway: Mallampati: II  TM distance: >3 FB   Neck ROM: full  Mouth opening: > = 3 FB   Dental:          Pulmonary:Negative Pulmonary ROS                              Cardiovascular:Negative CV ROS                      Neuro/Psych:   Negative Neuro/Psych ROS              GI/Hepatic/Renal: Neg GI/Hepatic/Renal ROS            Endo/Other: Negative Endo/Other ROS                    Abdominal:             Vascular: Other Findings:           Anesthesia Plan      general     ASA 3    (34-year-old female presents for LEFT HIP ARTHROSCOPY, LABRAL REPAIR, OSTEOPLASTY ACETABULUM. Plan general anesthesia with ASA standard monitors. PENG block for postoperative pain control as requested by the attending surgeon. Questions answered.   Patient agreeable with anesthetic plan.  )  Induction: intravenous. Anesthetic plan and risks discussed with patient. Plan discussed with CRNA.     Attending anesthesiologist reviewed and agrees with Perla Wells MD   2/8/2023

## 2023-02-08 NOTE — DISCHARGE INSTRUCTIONS
Hip  Discharge Instructions    To prevent Clot formation, you have been placed on the following medication:  Take aspirin 81 mg twice a day starting day after surgery   Surgical Site Care:  Keep incision clean and dry. May shower on post-op day #3 with waterproof dressing on. Change to new waterproof dressing between 5-7 days. Physical Therapy:  Weight Bearing Status:     Partial weight bearing (30-50%)  Precautions  Per Physical Therapy handout  Pain Medications  You were given oxycodone (Oxycontin, Oxyir)  Wean off pain medications as you deem appropriate as long as pain is under control  Be sure to drink plenty of fluids (recommend water) while taking narcotic pain medications to prevent constipation  You may take an over the counter laxative or stool softener as needed to prevent/treat constipation as well, we recommend Senokot S OTC. We recommend that you consider taking these medications the entire time you are taking pain medication. Cold packs/Ice packs/Machine  May be used as much as necessary to reduce swelling/inflammation/soreness  Be sure to have a barrier (cloth, clothing, towel) between your skin/incision and the ice pack to prevent frostbite  Contact office if  Increased redness, swelling, drainage of any kind, and/or pain to surgery site. As well as new onset fevers and or chills. These could signify an infection. Calf or thigh tenderness to touch as well as increased swelling or redness. This could signify a clot formation. Numbness or tingling to an area around the incision site or below the incision site (toes). Any rash appears, increased  or new onset nausea/vomiting occur. This may indicate a reaction to a medication. Phone # [phone]  Follow up with Dr. Hieu Ritchie or Johnie Barker PA-C at scheduled appointment time. Please continue to use your Incentive Spirometer at home every hour while awake.   Discharge Medications    Narcotic Pain Medications    ____ Hydrocodone/acetaminophen 5/325mg 1 tab every 4 hours as needed for pain  __x__ Oxycodone 5mg 1 tab every 6 hours as needed for pain  ____ Tramadol 50mg 1 tab every 4 hours as needed for pain    Anti-inflammatory/Pain Medication    __x__ Meloxicam 15mg 1 tab once a day  ____ Celebrex 200mg 1 tab once a day  __x__ Medrol Dosepak 1 kit as directed (start post-op day 1)    If Medrol Dosepak and either Meloxicam or Celebrex is prescribed complete the Medrol Dosepak before starting Meloxicam or Celebrex    Anti-coagulation Medication  __x__ Aspirin 81mg 1 tab twice per day  ____ Eliquis 2.5mg 1 tab twice per day  ____ Lovenox 40mg once per day  ____ Lovenox 30mg twice per day    Start anti-coagulation medications the day after surgery    Muscle relaxer     __x__ Cyclobenzaprine 10mg 1 tab up to 3 times a day as needed for muscle spasms and pain  ____ Methocarbamol 750mg 1 tab up to 3 times a day as needed for muscle spasms and pain          Nausea/Vomiting Medications    __x__ Ondansetron 4 mg 1 tab up to 4 times daily as needed  ____ Promethazine 25mg 1 tab up to 3 times daily as needed        [facility]    There are potential side effects of anesthesia or sedation you may experience for the first 24 hours. These side effects include:    Confusion or Memory loss, Dizziness, or Delayed Reaction Times   [x]A responsible person should be with you for the next 24 hours. Do not operate any vehicles (automobiles, bicycles, motorcycles) or power tools or machinery for 24 hours. Do not sign any legal documents or make any legal decisions for 24 hours. Do not drink alcohol for 24 hours or while taking narcotic pain medication. Nausea    [x]Start with light diet and progress to your normal diet as you feel like eating. However, if you experience nausea or repeated episodes of vomiting which persist beyond 12-24 hours, notify your physician.   Once nausea has passed, remember to keep drinking fluids. Difficulty Passing Urine  [x]Drink extra amounts of fluid today. Notify your physician if you have not urinated within 8 hours after your procedure or you feel uncomfortable. Irritated Throat from a Breathing Tube  [x]Drink extra amounts of fluid today. Lozenges may help. Muscle Aches  [x]You may experience some generalized body aches as your muscles recover from medications used to relax them during surgery. These will gradually subside. MEDICATION INSTRUCTIONS: Prescriptions already picked up in [address]    []Prescription(S) x     sent with you. Use as directed. When taking pain medications, you may experience the side effect of dizziness or drowsiness. Do not drink alcohol or drive when taking these medications. []Prescription(S) x          Called to Pharmacy Name and location:    [x]Give the list of your medications to your primary care physician on your next visit. Keep your med list updated and carry it with in case of emergencies. [x] Narcotic pain medications can cause the side effect of significant constipation. You may want to add a stool softener to your postoperative medication schedule or speak to your surgeon on how best to manage this side effect. NARCOTIC SAFETY:  Your pain medicine is only for you to take. Safely store your medicines. Store pills up high and out of reach of children and pets. Ensure safety caps are snapped tightly  Keep track of how many pills you have left    Unused medication can be disposed of by taking them to a drop-off box or take-back program that is authorized by the [facility]. Access to a site near you can be found on the [facility] Diversion [facility] website ([address]. [facility].gov). If you have a CPAP machine, it is very important that you use it daily during all periods of sleep and daytime rest during your recovery at home.   Surgery and Anesthesia place a significant amount of stress on your body.  Using your CPAP will help keep you safe and lessen the negative effects of that stress. FOLLOW-UP RECOVERY CARE:  [x]Call the office at [phone] for follow-up appointment and problems    Watch for these possible complications, symptoms, or side effects of anesthesia. Call physician if they or any other problems occur:  Signs of INFECTION   > Fever over 101°     > Redness, swelling, hardness or warmth at the operative site   >Foul smelling or cloudy drainage at the operative site   Unrelieved PAIN  Unrelieved NAUSEA  Blood soaked dressing. (Some oozing may be normal)  Inability to urinate      Numb, pale, blue, cold or tingling extremity      Physician:  Dr. Beto Higginbotham    The above instructions were reviewed with patient/significant other. The following additional patient specific information was reviewed with the patient/significant other:  [x]Procedure/physician specific instructions  [x]Medication information sheet(S) including potential side effects  []Imeldas egress test  []Pain Ball management  []FAQ Catheter associated blood stream infections  []FAQ Surgical Site Infections  []Other-    I have read and understand the instructions given to me: ____________________________________________   (Patient/S.O. Signature)            Date/time 2/8/2023 4:41 PM         PACU:  [phone]   M-F 700 AM - 7 PM      SAME DAY SERVICES:  [phone] M-F 7AM-6PM      PERIPHERAL NERVE BLOCK INSTRUCTIONS     Please remember while having a nerve block you are at an increased risk for BALANCE ISSUES AND FALLS!! You were given a nerve block today from the anesthesiologist. Most nerve blocks last anywhere from 6-36 hours. You should start taking your pain medication before the block wears off or when you first begin feeling discomfort. It takes at least 30-60 minutes for a pain pill to take effect. Pain medications should be taken with food.    Consider setting an alarm through the night to help manage your pain level so you do not wake up with too much pain. Pain medicines can cause more sedation and decrease your breathing so ONLY take as directed. If you have Sleep Apnea, you definitely need to use your C-Pap machine. What to expect after a nerve block:   Numbness, tingling- arm or leg feels heavy or asleep  Weakness or inability to move or control your arm or leg   Inability to feel temperature changes to your arm or leg  Usually the weakness wears off first, followed by the tingling or heaviness. You may notice more pain at this point and should start taking your pain meds. If you had a shoulder block, you may experience:  Mild shortness of breath (may be relieved by sitting up in a chair or recliner)  Hoarse voice  Blurry vision  Unequal pupils  Drooping of your face (eye or lip) on the same side as the nerve block  Swelling at the injection site on the side of your neck  These side effects should resolve as the block wears off! IF you have severe or prolonged shortness of breath-  GO to the nearest Emergency Room! If you had a nerve block of your arm or leg:  Protect the arm or leg from extreme hot or cold temperatures  Protect the arm or leg from obstructing blood flow by frequent position changes- Make sure fingers/ toes stay pink and warm. Call surgeon with any changes  For leg block, get assistance walking until the block wears off, ie:  crutches, walker, support person   If you continue to feel the effects of the nerve block for longer than 72 hours- call [facility] at [phone] and ask to speak with the Anesthesiologist on call. If you smoke STOP. We care about your health!

## 2023-02-09 NOTE — PROGRESS NOTES
PACU Discharge Note    Current Allergies: No known allergies    Pt meets criteria for discharge to home per Joseluis Score and ASPAN standards. Discussed with patient and responsible individual receiving instructions how to measure pain per numerical scale and when to contact doctor if prescribed medications are not helping with post operative pain    Discharge instructions reviewed with patient and family. Both verbalized understanding of instructions. Gave patient and family opportunity to ask questions. All questions reviewed and answered. Documents signed and copy of discharge instructions given. Vitals:    02/08/23 1830   BP: 120/81   Pulse: 94   Resp: 19   Temp: 97.7 °F (36.5 °C)   SpO2: 97%      BP within 20% of pt's admitting BP per JOSELUIS SCORE      Intake/Output Summary (Last 24 hours) at 2/8/2023 1904  Last data filed at 2/8/2023 1830  Gross per 24 hour   Intake 1570 ml   Output 20 ml   Net 1550 ml         Pain assessment:    Pain Level: 3    Offered patient opportunity to use restroom prior to discharge. Patient voided when wheeled to bathroom prior to discharge.     Patient discharged to home/self care via wheel chair by transporter/RN with a responsible individual.      2/8/2023 7:04 PM

## 2023-02-24 ENCOUNTER — OFFICE VISIT (OUTPATIENT)
Dept: ORTHOPEDIC SURGERY | Age: 38
End: 2023-02-24

## 2023-02-24 VITALS — WEIGHT: 180 LBS | BODY MASS INDEX: 24.38 KG/M2 | HEIGHT: 72 IN

## 2023-02-24 DIAGNOSIS — S73.192A TEAR OF LEFT ACETABULAR LABRUM, INITIAL ENCOUNTER: Primary | ICD-10-CM

## 2023-02-24 PROCEDURE — 99024 POSTOP FOLLOW-UP VISIT: CPT | Performed by: ORTHOPAEDIC SURGERY

## 2023-02-24 NOTE — PROGRESS NOTES
Dr Stanislav Kilgore      Date /Time 2/24/2023       9:41 AM EST  Name Ru Grijalva             1985   Location  [address]  MRN [id number]                Chief Complaint   Patient presents with    Hip Pain     Left        History of Present Illness      Ru Grijalva is a 44 y.o. female is here for post-op visit after LEFT        Patient is 2 weeks status post left hip arthroscopic labral repair. Patient doing well. Pain controlled. Patient denies any fever or chills    Physical Exam    Based off 1997 Exam Criteria    Ht 6' 2\" (1.88 m)   Wt 180 lb (81.6 kg)   LMP 01/30/2023   BMI 23.11 kg/m²      Constitutional:       General: He is not in acute distress. Appearance: Normal appearance. LEFT Hip: incision clean, intact, healing appropriately. No surrounding  erythema or fluctuance. Neuro intact distal. No evidence of DVT. Imaging             Assessment and Plan    Sandy Edwards was seen today for hip pain. Diagnoses and all orders for this visit:    Tear of left acetabular labrum, initial encounter      Patient is doing well. She can advance to weightbearing as tolerated at this time. She will start physical therapy along the protocol. We will see her back in 4 weeks or sooner if problems arise. Electronically signed by Stanislav Kilgore MD on 2/24/2023 at 9:41 AM  This dictation was generated by voice recognition computer software. Although all attempts are made to edit the dictation for accuracy, there may be errors in the transcription that are not intended.

## 2023-03-02 ENCOUNTER — HOSPITAL ENCOUNTER (OUTPATIENT)
Dept: PHYSICAL THERAPY | Age: 38
Setting detail: THERAPIES SERIES
Discharge: HOME | End: 2023-03-02
Payer: COMMERCIAL

## 2023-03-02 PROCEDURE — 97161 PT EVAL LOW COMPLEX 20 MIN: CPT

## 2023-03-02 PROCEDURE — 97140 MANUAL THERAPY 1/> REGIONS: CPT

## 2023-03-02 PROCEDURE — 97110 THERAPEUTIC EXERCISES: CPT

## 2023-03-02 NOTE — FLOWSHEET NOTE
[facility] and [address],  Sports Performance and Rehabilitation, [facility] [address] 12[address]                  [address] [facility]        Ivis Viramontes  Phone: [phone]  Fax: [phone]    Physical Therapy Treatment Note/ Progress Report:           Date:  3/2/2023    Patient Name:  Anne Montalvo    :  1985  MRN: [id number]  Restrictions/Precautions:    Medical/Treatment Diagnosis Information:  Tear of left acetabular labrum, initial encounter [S73.192A]  PT Diagnosis: L Hip Pain [F19.811]     Insurance/Certification information:  [facility]  Physician Information:  Sally Cobb MD  Has the plan of care been signed (Y/N):        []  Yes  [x]  No     Date of Patient follow up with Physician: 3/24/2023      Is this a Progress Report:     []  Yes  [x]  No        If Yes:  Date Range for reporting period:  Beginning: 3/2/2023  Ending    Progress report will be due (10 Rx or 30 days whichever is less): 9540       Recertification will be due (POC Duration  / 90 days whichever is less): 2023      Visit # Insurance Allowable Auth Required   In-person 1 40 []  Yes [x]  No        Functional Scale: LEFI = 17/80 = 78% Deficit Date assessed:  3/2/2023       Number of Comorbidities:  [x]0     []1-2    []3+    Latex Allergy:  [x]NO      []YES  Preferred Language for Healthcare:   [x]English       []other:      Pain level:  3/10     SUBJECTIVE:  See eval    OBJECTIVE: See eval  Observation:   Test measurements:      RESTRICTIONS/PRECAUTIONS: 50% WB, Crutches, No extension past neutral     Exercises/Interventions:     Therapeutic Ex (09292)/NMR re-education (20343) Sets/Reps Notes/CUES   Ankle pumps 10x    Glute sets  10\"x10    Long arc quad  2x15 L     Hamstring curl  2x15 L only                                            Manual Intervention (94704)     Hip flex/ext/circumduction PROM  10'                HEP instruction:   Access Code: [email or web address]  URL: [email or web address]  Date: 03/02/2023  Prepared by: Rosalva Griffith     Exercises  Supine Ankle Pumps - 2 x daily - 7 x weekly - 2 sets - 20 reps  Supine Gluteal Sets - 2 x daily - 7 x weekly - 2 sets - 10 reps - 10 hold  Seated Long Arc Quad - 2 x daily - 7 x weekly - 2-3 sets - 15 reps  Standing Knee Flexion AROM with Chair Support - 1 x daily - 7 x weekly - 2 sets - 15 reps      Therapeutic Exercise and NMR EXR  [x] (07152) Provided verbal/tactile cueing for activities related to strengthening, flexibility, endurance, ROM for improvements in LE, proximal hip, and core control with self care, mobility, lifting, ambulation.  [] (46562) Provided verbal/tactile cueing for activities related to improving balance, coordination, kinesthetic sense, posture, motor skill, proprioception  to assist with LE, proximal hip, and core control in self care, mobility, lifting, ambulation and eccentric single leg control.      NMR and Therapeutic Activities:    [] (19284 or 38521) Provided verbal/tactile cueing for activities related to improving balance, coordination, kinesthetic sense, posture, motor skill, proprioception and motor activation to allow for proper function of core, proximal hip and LE with self care and ADLs  [] (91409) Gait Re-education- Provided training and instruction to the patient for proper LE, core and proximal hip recruitment and positioning and eccentric body weight control with ambulation re-education including up and down stairs     Home Exercise Program:    [x] (34213) Reviewed/Progressed HEP activities related to strengthening, flexibility, endurance, ROM of core, proximal hip and LE for functional self-care, mobility, lifting and ambulation/stair navigation   [] (39729)Reviewed/Progressed HEP activities related to improving balance, coordination, kinesthetic sense, posture, motor skill, proprioception of core, proximal hip and LE for self care, mobility, lifting, and ambulation/stair navigation      Manual Treatments:  PROM / STM / Oscillations-Mobs:  G-I, II, III, IV (PA's, Inf., Post.)  [x] (25411) Provided manual therapy to mobilize LE, proximal hip and/or LS spine soft tissue/joints for the purpose of modulating pain, promoting relaxation,  increasing ROM, reducing/eliminating soft tissue swelling/inflammation/restriction, improving soft tissue extensibility and allowing for proper ROM for normal function with self care, mobility, lifting and ambulation. Modalities:     [] GAME READY (VASO)- for significant edema, swelling, pain control. Charges  Timed Code Treatment Minutes: 23   Total Treatment Minutes: 35     [x] EVAL (LOW) 90486   [] EVAL (MOD) 78028  [] EVAL (HIGH) 20209   [] RE-EVAL     [x] US(40640) x 1    [] IONTO  [] NMR (81757) x     [] VASO  [x] Manual (26258) x 1     [] Other:  [] TA x      [] Mech Traction (42237)  [] ES(attended) (59996)      [] ES (un) (12435):       GOALS:   Patient stated goal: Return to pickle ball   [] Progressing: [] Met: [] Not Met: [] Adjusted     Therapist goals for Patient:   Short Term Goals: To be achieved in: 2 weeks  1. Independent in HEP and progression per patient tolerance, in order to prevent re-injury. [] Progressing: [] Met: [] Not Met: [] Adjusted  2. Patient will have a decrease in pain to facilitate improvement in movement, function, and ADLs as indicated by Functional Deficits. [] Progressing: [] Met: [] Not Met: [] Adjusted     Long Term Goals: To be achieved in: 12 weeks  1. LEFI will improve by at least 9 points to reach Sae Her\[facility] [place] [facility]\"" [place] [address] and improve function. [] Progressing: [] Met: [] Not Met: [] Adjusted  2. Patient will demonstrate increased AROM to 120 degrees of hip flexion and hip ER/IR equal bilaterally to allow for proper joint functioning as indicated by patients Functional Deficits. [] Progressing: [] Met: [] Not Met: [] Adjusted  3.  Patient will demonstrate an increase in Strength to good proximal hip strength and control to at least 5/5 globally in L hip to allow for proper functional mobility as indicated by patients Functional Deficits. [] Progressing: [] Met: [] Not Met: [] Adjusted  4. Patient will return to light weight training/workout classes at least 2x/week without increased symptoms or restriction. [] Progressing: [] Met: [] Not Met: [] Adjusted    Overall Progression Towards Functional goals/ Treatment Progress Update:  [] Patient is progressing as expected towards functional goals listed. [] Progression is slowed due to complexities/Impairments listed. [] Progression has been slowed due to co-morbidities. [x] Plan just implemented, too soon to assess goals progression <30days   [] Goals require adjustment due to lack of progress  [] Patient is not progressing as expected and requires additional follow up with physician  [] Other    Prognosis for POC: [x] Good [] Fair  [] Poor      Patient requires continued skilled intervention: [x] Yes  [] No    Treatment/Activity Tolerance:  [x] Patient able to complete treatment  [] Patient limited by fatigue  [] Patient limited by pain    [] Patient limited by other medical complications  [] Other:     ASSESSMENT: See eval    PLAN: See eval  [] Continue per plan of care [] Alter current plan (see comments above)  [x] Plan of care initiated [] Hold pending MD visit [] Discharge      Electronically signed by:  Monroe Hooker PT, Blanche Reed, IKE    Therapist was present, directed the patient's care, made skilled judgement, and was responsible for assessment and treatment of the patient. Note: If patient does not return for scheduled/ recommended follow up visits, this note will serve as a discharge from care along with most recent update on progress.

## 2023-03-02 NOTE — PLAN OF CARE
[facility] [place],  Sports [facility] and Rehabilitation, [address]                  [address] [id number]  Phone: [phone]  Fax: [phone]     Physical Therapy Certification    Dear Kurt Manzano MD,    We had the pleasure of evaluating the following patient for physical therapy services at [facility].  A summary of our findings can be found in the initial assessment below.  This includes our plan of care.  If you have any questions or concerns regarding these findings, please do not hesitate to contact me at the office phone number checked above.  Thank you for the referral.       Physician Signature:_______________________________Date:__________________  By signing above (or electronic signature), therapist’s plan is approved by physician    Patient: Silva Toussaint   : 1985   MRN: [id number]  Referring Physician: Kurt Manzano MD      Evaluation Date: 3/2/2023      Medical Diagnosis Information:  Tear of left acetabular labrum, initial encounter [S73.192A]   PT Diagnosis: L Hip Pain [M25.552]                                    Insurance information: BC[employer]       Precautions/ Contra-indications: 50% WB, Crutches, No extension past neutral     C-SSRS Triggered by Intake questionnaire (Past 2 wk assessment):   [x] No, Questionnaire did not trigger screening.   [] Yes, Patient intake triggered further evaluation      [] C-SSRS Screening completed  [] PCP notified via Plan of Care  [] Emergency services notified     Latex Allergy:  [x]NO      []YES  Preferred Language for Healthcare:   [x]English       []other:    SUBJECTIVE: Patient stated complaint: History of L hip pain. Received PT in  and then 2 cortisone shots in . Increased hip pain related to playing tennis. Underwent L hip labral repair on 2023. Hip pain has improved dramatically since surgery.     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    Living Status/Prior Level of Function: Independent with ADLs and IADLs, Active (Working out/Tennis)     OBJECTIVE:     ROM LEFT RIGHT   All other LE ROM WFL unless noted below      HIP Flex 90 WFL   HIP ER NT    HIP IR NT    HIP Ext NT         Strength  LEFT RIGHT   Musculature NT due to post op status      HIP Flexors NT    HIP Abductors NT    HIP Ext NT    Hip ER NT    Knee EXT (quad) NT    Knee Flex (HS) NT      Reflexes/Sensation:    [x]Dermatomes/Myotomes intact    [x]Reflexes equal and normal bilaterally   []Other:    Joint mobility:    [x]Normal    []Hypo   []Hyper    Palpation: NT due to post op status     Bandages/Dressings/Incisions: Steri strips in tact under water proof band aide without signs of infection     Gait: (include devices/WB status) 50% WB with crutches     Orthopedic Special Tests: None                        [x] Patient history, allergies, meds reviewed. Medical chart reviewed. See intake form. Review Of Systems (ROS):  [x]Performed Review of systems (Integumentary, CardioPulmonary, Neurological) by intake and observation. Intake form has been scanned into medical record. Patient has been instructed to contact their primary care physician regarding ROS issues if not already being addressed at this time.       Co-morbidities/Complexities (which will affect course of rehabilitation):   [x]None           Arthritic conditions   []Rheumatoid arthritis (M05.9)  []Osteoarthritis (M19.91)   Cardiovascular conditions   []Hypertension (I10)  []Hyperlipidemia (E78.5)  []Angina pectoris (I20)  []Atherosclerosis (I70)   Musculoskeletal conditions   []Disc pathology   []Congenital spine pathologies   []Prior surgical intervention  []Osteoporosis (M81.8)  []Osteopenia (M85.8)   Endocrine conditions   []Hypothyroid (E03.9)  []Hyperthyroid Gastrointestinal conditions   []Constipation (R13.02)   Metabolic conditions   []Morbid obesity (E66.01)  []Diabetes type 1(E10.65) or 2 (E11.65)   []Neuropathy (G60.9)     Pulmonary conditions   []Asthma (J45)  []Coughing   []COPD (J44.9)   Psychological Disorders  []Anxiety (F41.9)  []Depression (F32.9)   []Other:   []Other:          Barriers to/and or personal factors that will affect rehab potential:              []Age  []Sex              [x]Motivation/Lack of Motivation                        []Co-Morbidities              []Cognitive Function, education/learning barriers              []Environmental, home barriers              []profession/work barriers  []past PT/medical experience  [x]other: High PLOF   Justification: Patient's athletic past and active lifestyle will positively impact rehab potential.    Falls Risk Assessment (30 days):   [x] Falls Risk assessed and no intervention required.   [] Falls Risk assessed and Patient requires intervention due to being higher risk   TUG score (>12s at risk):     [] Falls education provided, including       ASSESSMENT:   Functional Impairments:     []Noted lumbar/proximal hip/LE joint hypomobility   [x]Decreased LE functional ROM   []Decreased core/proximal hip strength and neuromuscular control   [x]Decreased LE functional strength   []Reduced balance/proprioceptive control   []other:      Functional Activity Limitations (from functional questionnaire and intake)   [x]Reduced ability to tolerate prolonged functional positions   []Reduced ability or difficulty with changes of positions or transfers between positions   []Reduced ability to maintain good posture and demonstrate good body mechanics with sitting, bending, and lifting   []Reduced ability to sleep   [] Reduced ability or tolerance with driving and/or computer work   [x]Reduced ability to perform lifting, carrying tasks   [x]Reduced ability to squat   [x]Reduced ability to forward bend   [x]Reduced ability to ambulate prolonged functional periods/distances/surfaces   [x]Reduced ability to ascend/descend stairs   [x]Reduced ability to run, hop, cut or jump   []other:    Participation Restrictions   [x]Reduced participation in self care activities   [x]Reduced participation in home management activities   [x]Reduced participation in work activities   [x]Reduced participation in social activities. [x]Reduced participation in sport/recreation activities. Classification :    [x]Signs/symptoms consistent with post-surgical status including decreased ROM, strength and function.    []Signs/symptoms consistent with joint sprain/strain   []Signs/symptoms consistent with patella-femoral syndrome   []Signs/symptoms consistent with knee OA/hip OA   []Signs/symptoms consistent with internal derangement of knee/Hip   []Signs/symptoms consistent with functional hip weakness/NMR control      []Signs/symptoms consistent with tendinitis/tendinosis    []signs/symptoms consistent with pathology which may benefit from Dry needling      []other:      Prognosis/Rehab Potential:      [x]Excellent   []Good    []Fair   []Poor    Tolerance of evaluation/treatment:    []Excellent   [x]Good    []Fair   []Poor    Physical Therapy Evaluation Complexity Justification  [x] A history of present problem with:  [x] no personal factors and/or comorbidities that impact the plan of care;  []1-2 personal factors and/or comorbidities that impact the plan of care  []3 personal factors and/or comorbidities that impact the plan of care  [x] An examination of body systems using standardized tests and measures addressing any of the following: body structures and functions (impairments), activity limitations, and/or participation restrictions;:  [x] a total of 1-2 or more elements   [] a total of 3 or more elements   [] a total of 4 or more elements   [x] A clinical presentation with:  [x] stable and/or uncomplicated characteristics   [] evolving clinical presentation with changing characteristics  [] unstable and unpredictable characteristics;   [x] Clinical decision making of [x] low, [] moderate, [] high complexity using standardized patient assessment instrument and/or measurable assessment of functional outcome.    [x] EVAL (LOW) 76061 (typically 20 minutes face-to-face)  [] EVAL (MOD) 61252 (typically 30 minutes face-to-face)  [] EVAL (HIGH) 06363 (typically 45 minutes face-to-face)  [] RE-EVAL       PLAN:   Frequency/Duration:  1-2 days per week for 12 Weeks:  Interventions:  [x]  Therapeutic exercise including: strength training, ROM, for Lower extremity and core   [x]  NMR activation and proprioception for LE, Glutes and Core   [x]  Manual therapy as indicated for LE, Hip and spine to include: Dry Needling/IASTM, STM, PROM, Gr I-IV mobilizations, manipulation.   [x] Modalities as needed that may include: thermal agents, E-stim, Biofeedback, US, iontophoresis as indicated  [x] Patient education on joint protection, postural re-education, activity modification, progression of HEP.    HEP instruction:   Access Code: [email or web address]  URL: [email or web address]  Date: 03/02/2023  Prepared by: Ewelina Chase    Exercises  Supine Ankle Pumps - 2 x daily - 7 x weekly - 2 sets - 20 reps  Supine Gluteal Sets - 2 x daily - 7 x weekly - 2 sets - 10 reps - 10 hold  Seated Long Arc Quad - 2 x daily - 7 x weekly - 2-3 sets - 15 reps  Standing Knee Flexion AROM with Chair Support - 1 x daily - 7 x weekly - 2 sets - 15 reps      GOALS:  Patient stated goal: Return to pickle ball   [] Progressing: [] Met: [] Not Met: [] Adjusted    Therapist goals for Patient:   Short Term Goals: To be achieved in: 2 weeks  1. Independent in HEP and progression per patient tolerance, in order to prevent re-injury.   [] Progressing: [] Met: [] Not Met: [] Adjusted  2. Patient  will have a decrease in pain to facilitate improvement in movement, function, and ADLs as indicated by Functional Deficits. [] Progressing: [] Met: [] Not Met: [] Adjusted    Long Term Goals: To be achieved in: 12 weeks  1. LEFI will improve by at least 9 points to reach [address] [employer] and improve function. [] Progressing: [] Met: [] Not Met: [] Adjusted  2. Patient will demonstrate increased AROM to 120 degrees of hip flexion and hip ER/IR equal bilaterally to allow for proper joint functioning as indicated by patients Functional Deficits. [] Progressing: [] Met: [] Not Met: [] Adjusted  3. Patient will demonstrate an increase in Strength to good proximal hip strength and control to at least 5/5 globally in L hip to allow for proper functional mobility as indicated by patients Functional Deficits. [] Progressing: [] Met: [] Not Met: [] Adjusted  4. Patient will return to light weight training/workout classes at least 2x/week without increased symptoms or restriction. [] Progressing: [] Met: [] Not Met: [] Adjusted    Electronically signed by:  Patric Sanders PT, Kisha Lopez, SPT    Therapist was present, directed the patient's care, made skilled judgement, and was responsible for assessment and treatment of the patient.

## 2023-03-10 ENCOUNTER — HOSPITAL ENCOUNTER (OUTPATIENT)
Dept: PHYSICAL THERAPY | Age: 38
Setting detail: THERAPIES SERIES
Discharge: HOME | End: 2023-03-10
Payer: COMMERCIAL

## 2023-03-10 PROCEDURE — 97140 MANUAL THERAPY 1/> REGIONS: CPT

## 2023-03-10 PROCEDURE — 97110 THERAPEUTIC EXERCISES: CPT

## 2023-03-10 NOTE — FLOWSHEET NOTE
[facility] and [address],  Sports Performance and Rehabilitation, [place] [id number] [address] [place] [address]                  [address] [place] [address],[address]        [address] [facility], [address] [place] [address]  Phone: [phone]  Fax: [phone]    Physical Therapy Treatment Note/ Progress Report:           Date:  3/10/2023    Patient Name:  Ion Downing    :  1985  MRN: [id number]  Restrictions/Precautions:    Medical/Treatment Diagnosis Information:  Tear of left acetabular labrum, initial encounter [S73.192A]  PT Diagnosis: L Hip Pain [P27.488]     Insurance/Certification information:  [facility]  Physician Information:  Karyle Duke, MD  Has the plan of care been signed (Y/N):        []  Yes  [x]  No     Date of Patient follow up with Physician: 3/24/2023      Is this a Progress Report:     []  Yes  [x]  No        If Yes:  Date Range for reporting period:  Beginning: 3/2/2023  Ending    Progress report will be due (10 Rx or 30 days whichever is less): 7333       Recertification will be due (POC Duration  / 90 days whichever is less): 2023      Visit # Insurance Allowable Auth Required   In-person 2 40 []  Yes [x]  No        Functional Scale: LEFI = 17/80 = 78% Deficit Date assessed:  3/2/2023       Number of Comorbidities:  [x]0     []1-2    []3+    Latex Allergy:  [x]NO      []YES  Preferred Language for Healthcare:   [x]English       []other:      Pain level:  1-2/10     SUBJECTIVE:  Patient reports hip has been doing well overall. Had some soreness (1-2/10) on anterior hip with LAQ at home. Has been doing PROM machine at home.      OBJECTIVE:   Observation:Hip PROM pain free to 110 with inferior mob , 100 pain free without mob  Test measurements:      RESTRICTIONS/PRECAUTIONS: 50% WB, Crutches, No extension past neutral - DOS = 2023    Exercises/Interventions:     Therapeutic Ex (22007)/NMR re-education (48728) Sets/Reps Notes/CUES      Glute sets  10\"x30    Long arc quad  2x15 L     Hamstring curl 2x15 L only                                            Manual Intervention ([id number])     Hip flex/ext/circumduction/ER/IR PROM , Low grade posterior mobs, Low-high grade inferior mobs  25'                HEP instruction:   Access Code: [email or web address]  URL: [email or web address]  Date: 03/02/2023  Prepared by: Yaz Gallegos     Exercises  Supine Ankle Pumps - 2 x daily - 7 x weekly - 2 sets - 20 reps  Supine Gluteal Sets - 2 x daily - 7 x weekly - 2 sets - 10 reps - 10 hold  Seated Long Arc Quad - 2 x daily - 7 x weekly - 2-3 sets - 15 reps  Standing Knee Flexion AROM with Chair Support - 1 x daily - 7 x weekly - 2 sets - 15 reps      Therapeutic Exercise and NMR EXR  [x] (20132) Provided verbal/tactile cueing for activities related to strengthening, flexibility, endurance, ROM for improvements in LE, proximal hip, and core control with self care, mobility, lifting, ambulation.  [] (21292) Provided verbal/tactile cueing for activities related to improving balance, coordination, kinesthetic sense, posture, motor skill, proprioception  to assist with LE, proximal hip, and core control in self care, mobility, lifting, ambulation and eccentric single leg control.      NMR and Therapeutic Activities:    [] (21901 or 17569) Provided verbal/tactile cueing for activities related to improving balance, coordination, kinesthetic sense, posture, motor skill, proprioception and motor activation to allow for proper function of core, proximal hip and LE with self care and ADLs  [] (47109) Gait Re-education- Provided training and instruction to the patient for proper LE, core and proximal hip recruitment and positioning and eccentric body weight control with ambulation re-education including up and down stairs     Home Exercise Program:    [x] (75968) Reviewed/Progressed HEP activities related to strengthening, flexibility, endurance, ROM of core, proximal hip and LE for functional self-care, mobility, lifting and ambulation/stair navigation   [] (09757)Reviewed/Progressed HEP activities related to improving balance, coordination, kinesthetic sense, posture, motor skill, proprioception of core, proximal hip and LE for self care, mobility, lifting, and ambulation/stair navigation      Manual Treatments:  PROM / STM / Oscillations-Mobs:  G-I, II, III, IV (PA's, Inf., Post.)  [x] (35106) Provided manual therapy to mobilize LE, proximal hip and/or LS spine soft tissue/joints for the purpose of modulating pain, promoting relaxation,  increasing ROM, reducing/eliminating soft tissue swelling/inflammation/restriction, improving soft tissue extensibility and allowing for proper ROM for normal function with self care, mobility, lifting and ambulation. Modalities:     [] GAME READY (VASO)- for significant edema, swelling, pain control. Charges  Timed Code Treatment Minutes: 40   Total Treatment Minutes: 40     [] EVAL (LOW) 42668   [] EVAL (MOD) 99264  [] EVAL (HIGH) 91047   [] RE-EVAL     [x] NM(76525) x 1    [] IONTO  [] NMR (04672) x     [] VASO  [x] Manual (61992) x 2     [] Other:  [] TA x      [] Mech Traction (29694)  [] ES(attended) (36016)      [] ES (un) (26675):       GOALS:   Patient stated goal: Return to pickle ball   [] Progressing: [] Met: [] Not Met: [] Adjusted     Therapist goals for Patient:   Short Term Goals: To be achieved in: 2 weeks  1. Independent in HEP and progression per patient tolerance, in order to prevent re-injury. [] Progressing: [] Met: [] Not Met: [] Adjusted  2. Patient will have a decrease in pain to facilitate improvement in movement, function, and ADLs as indicated by Functional Deficits. [] Progressing: [] Met: [] Not Met: [] Adjusted     Long Term Goals: To be achieved in: 12 weeks  1. LEFI will improve by at least 9 points to [facility] [employer] [facility] and improve function. [] Progressing: [] Met: [] Not Met: [] Adjusted  2.  Patient will demonstrate increased AROM to 120 degrees of hip flexion and hip ER/IR equal bilaterally to allow for proper joint functioning as indicated by patients Functional Deficits. [] Progressing: [] Met: [] Not Met: [] Adjusted  3. Patient will demonstrate an increase in Strength to good proximal hip strength and control to at least 5/5 globally in L hip to allow for proper functional mobility as indicated by patients Functional Deficits. [] Progressing: [] Met: [] Not Met: [] Adjusted  4. Patient will return to light weight training/workout classes at least 2x/week without increased symptoms or restriction. [] Progressing: [] Met: [] Not Met: [] Adjusted    Overall Progression Towards Functional goals/ Treatment Progress Update:  [] Patient is progressing as expected towards functional goals listed. [] Progression is slowed due to complexities/Impairments listed. [] Progression has been slowed due to co-morbidities. [x] Plan just implemented, too soon to assess goals progression <30days   [] Goals require adjustment due to lack of progress  [] Patient is not progressing as expected and requires additional follow up with physician  [] Other    Prognosis for POC: [x] Good [] Fair  [] Poor      Patient requires continued skilled intervention: [x] Yes  [] No    Treatment/Activity Tolerance:  [x] Patient able to complete treatment  [] Patient limited by fatigue  [] Patient limited by pain    [] Patient limited by other medical complications  [] Other:     ASSESSMENT: Davy Don has responded well to initiation of POC with continued controlled pain levels. Patient does feel some pinching in anterior hip past 100 flexion PROM but is improved with inferior mobilization allowing for pain free PROM to 110. Will continue to work on progressing PROM to 120 degrees of flexion without pain. Will continue to follow protocol for further progressions. She would continue to benefit from skilled physical therapy to maximize functional outcomes and progress towards goals.      PLAN: See eval  [x] Continue per plan of care [] Alter current plan (see comments above)  [] Plan of care initiated [] Hold pending MD visit [] Discharge      Electronically signed by:  Rosita Infante PT, Tiff Hidalgo, IKE    Therapist was present, directed the patient's care, made skilled judgement, and was responsible for assessment and treatment of the patient. Note: If patient does not return for scheduled/ recommended follow up visits, this note will serve as a discharge from care along with most recent update on progress.

## 2023-03-14 ENCOUNTER — HOSPITAL ENCOUNTER (OUTPATIENT)
Dept: PHYSICAL THERAPY | Age: 38
Setting detail: THERAPIES SERIES
Discharge: HOME | End: 2023-03-14
Payer: COMMERCIAL

## 2023-03-14 PROCEDURE — 97110 THERAPEUTIC EXERCISES: CPT

## 2023-03-14 PROCEDURE — 97140 MANUAL THERAPY 1/> REGIONS: CPT

## 2023-03-14 NOTE — FLOWSHEET NOTE
[facility] and [address],  Sports Performance and Rehabilitation, Count includes the [facility] [address]                  793 [facility],5Th Floor        Ivis Viramontes  Phone: [phone]  Fax: [phone]    Physical Therapy Treatment Note/ Progress Report:           Date:  3/14/2023    Patient Name:  Cassie Mai    :  1985  MRN: [id number]  Restrictions/Precautions:    Medical/Treatment Diagnosis Information:  Tear of left acetabular labrum, initial encounter [S73.192A]  PT Diagnosis: L Hip Pain [H47.846]     Insurance/Certification information:  [facility]  Physician Information:  Sivan Sorenson MD  Has the plan of care been signed (Y/N):        []  Yes  [x]  No     Date of Patient follow up with Physician: 3/24/2023      Is this a Progress Report:     []  Yes  [x]  No        If Yes:  Date Range for reporting period:  Beginning: 3/2/2023  Ending    Progress report will be due (10 Rx or 30 days whichever is less):        Recertification will be due (POC Duration  / 90 days whichever is less): 2023      Visit # Insurance Allowable Auth Required   In-person 3 40 []  Yes [x]  No        Functional Scale: LEFI = 17/80 = 78% Deficit Date assessed:  3/2/2023       Number of Comorbidities:  [x]0     []1-2    []3+    Latex Allergy:  [x]NO      []YES  Preferred Language for Healthcare:   [x]English       []other:      Pain level:  1-2/10     SUBJECTIVE:  Patient reports hip has been doing well overall. Felt good after last session.      OBJECTIVE:   Observation:Hip PROM pain free to 120 with inferior mob , 110 pain free without mob  Test measurements:      RESTRICTIONS/PRECAUTIONS: 50% WB, Crutches, No extension past neutral - DOS = 2023    Exercises/Interventions:     Therapeutic Ex (34936)/NMR re-education (28938) Sets/Reps Notes/CUES   Bike  NV   Heel slides  2x10    Bridges 2x10 Feet elevated   S/L hip ABD 2x10    Long arc quad  3x12 L     Hamstring curl  2x15 L only 3 way hip ABD 2x10 L only    Calf raises  2x12    Weight shifts  2'                             Manual Intervention (91970)     Hip flex/ext/circumduction/ER/IR PROM , Low grade posterior mobs, Low-high grade inferior mobs  20'                HEP instruction:   Access Code: [email or web address]  URL: [email or web address]  Date: 03/02/2023  Prepared by: Stephanie Baeza     Exercises  Supine Ankle Pumps - 2 x daily - 7 x weekly - 2 sets - 20 reps  Supine Gluteal Sets - 2 x daily - 7 x weekly - 2 sets - 10 reps - 10 hold  Seated Long Arc Quad - 2 x daily - 7 x weekly - 2-3 sets - 15 reps  Standing Knee Flexion AROM with Chair Support - 1 x daily - 7 x weekly - 2 sets - 15 reps      Therapeutic Exercise and NMR EXR  [x] (15500) Provided verbal/tactile cueing for activities related to strengthening, flexibility, endurance, ROM for improvements in LE, proximal hip, and core control with self care, mobility, lifting, ambulation.  [] (45516) Provided verbal/tactile cueing for activities related to improving balance, coordination, kinesthetic sense, posture, motor skill, proprioception  to assist with LE, proximal hip, and core control in self care, mobility, lifting, ambulation and eccentric single leg control.      NMR and Therapeutic Activities:    [] (16142 or 98882) Provided verbal/tactile cueing for activities related to improving balance, coordination, kinesthetic sense, posture, motor skill, proprioception and motor activation to allow for proper function of core, proximal hip and LE with self care and ADLs  [] (30010) Gait Re-education- Provided training and instruction to the patient for proper LE, core and proximal hip recruitment and positioning and eccentric body weight control with ambulation re-education including up and down stairs     Home Exercise Program:    [x] (20117) Reviewed/Progressed HEP activities related to strengthening, flexibility, endurance, ROM of core, proximal hip and LE for functional self-care, mobility, lifting and ambulation/stair navigation   [] (12673)Reviewed/Progressed HEP activities related to improving balance, coordination, kinesthetic sense, posture, motor skill, proprioception of core, proximal hip and LE for self care, mobility, lifting, and ambulation/stair navigation      Manual Treatments:  PROM / STM / Oscillations-Mobs:  G-I, II, III, IV (PA's, Inf., Post.)  [x] (79249) Provided manual therapy to mobilize LE, proximal hip and/or LS spine soft tissue/joints for the purpose of modulating pain, promoting relaxation,  increasing ROM, reducing/eliminating soft tissue swelling/inflammation/restriction, improving soft tissue extensibility and allowing for proper ROM for normal function with self care, mobility, lifting and ambulation. Modalities:     [] GAME READY (VASO)- for significant edema, swelling, pain control. Charges  Timed Code Treatment Minutes: 45   Total Treatment Minutes: 55 (Ice)     [] EVAL (LOW) 95942   [] EVAL (MOD) 97492  [] EVAL (HIGH) 53397   [] RE-EVAL     [x] WR(31356) x 2    [] IONTO  [] NMR (25937) x     [] VASO  [x] Manual (04869) x 1     [] Other:  [] TA x      [] Mech Traction (20378)  [] ES(attended) (00414)      [] ES (un) (00750):       GOALS:   Patient stated goal: Return to pickle ball   [] Progressing: [] Met: [] Not Met: [] Adjusted     Therapist goals for Patient:   Short Term Goals: To be achieved in: 2 weeks  1. Independent in HEP and progression per patient tolerance, in order to prevent re-injury. [] Progressing: [] Met: [] Not Met: [] Adjusted  2. Patient will have a decrease in pain to facilitate improvement in movement, function, and ADLs as indicated by Functional Deficits. [] Progressing: [] Met: [] Not Met: [] Adjusted     Long Term Goals: To be achieved in: 12 weeks  1. LEFI will improve by at least 9 points to reach [address] and improve function. [] Progressing: [] Met: [] Not Met: [] Adjusted  2.  Patient will demonstrate increased AROM to 120 degrees of hip flexion and hip ER/IR equal bilaterally to allow for proper joint functioning as indicated by patients Functional Deficits. [] Progressing: [] Met: [] Not Met: [] Adjusted  3. Patient will demonstrate an increase in Strength to good proximal hip strength and control to at least 5/5 globally in L hip to allow for proper functional mobility as indicated by patients Functional Deficits. [] Progressing: [] Met: [] Not Met: [] Adjusted  4. Patient will return to light weight training/workout classes at least 2x/week without increased symptoms or restriction. [] Progressing: [] Met: [] Not Met: [] Adjusted    Overall Progression Towards Functional goals/ Treatment Progress Update:  [] Patient is progressing as expected towards functional goals listed. [] Progression is slowed due to complexities/Impairments listed. [] Progression has been slowed due to co-morbidities. [x] Plan just implemented, too soon to assess goals progression <30days   [] Goals require adjustment due to lack of progress  [] Patient is not progressing as expected and requires additional follow up with physician  [] Other    Prognosis for POC: [x] Good [] Fair  [] Poor      Patient requires continued skilled intervention: [x] Yes  [] No    Treatment/Activity Tolerance:  [x] Patient able to complete treatment  [] Patient limited by fatigue  [] Patient limited by pain    [] Patient limited by other medical complications  [] Other:     ASSESSMENT: Lani Balderas has responded well to progression of therex following protocol. Was able to progress to 120 with minor pinching in hip which improved with inferior mobilizations/mild hip scaption. She would continue to benefit from skilled physical therapy to maximize functional outcomes and progress towards goals.      PLAN: See yessica  [x] Continue per plan of care [] Alter current plan (see comments above)  [] Plan of care initiated [] Hold pending MD visit [] Discharge      Electronically signed by:  Ewelina Chase PT, Meg Chavez, SPT    Therapist was present, directed the patient's care, made skilled judgement, and was responsible for assessment and treatment of the patient.    Note: If patient does not return for scheduled/ recommended follow up visits, this note will serve as a discharge from care along with most recent update on progress.

## 2023-03-21 ENCOUNTER — HOSPITAL ENCOUNTER (OUTPATIENT)
Dept: PHYSICAL THERAPY | Age: 38
Setting detail: THERAPIES SERIES
Discharge: HOME | End: 2023-03-21
Payer: COMMERCIAL

## 2023-03-21 PROCEDURE — 97110 THERAPEUTIC EXERCISES: CPT

## 2023-03-21 PROCEDURE — 97140 MANUAL THERAPY 1/> REGIONS: CPT

## 2023-03-21 NOTE — FLOWSHEET NOTE
The [facility] and [address],  Sports Performance and Rehabilitation, [facility] [address]                  793 [facility],5Th Floor        Ivis Viramontes  Phone: [phone]  Fax: [phone]    Physical Therapy Treatment Note/ Progress Report:           Date:  3/21/2023    Patient Name:  Imani Hernandez    :  1985  MRN: [id number]  Restrictions/Precautions:    Medical/Treatment Diagnosis Information:  Tear of left acetabular labrum, initial encounter [S73.192A]  PT Diagnosis: L Hip Pain [D57.684]     Insurance/Certification information:  [facility]  Physician Information:  Mayuri Malin MD  Has the plan of care been signed (Y/N):        []  Yes  [x]  No     Date of Patient follow up with Physician: 3/24/2023      Is this a Progress Report:     []  Yes  [x]  No        If Yes:  Date Range for reporting period:  Beginning: 3/2/2023  Ending    Progress report will be due (10 Rx or 30 days whichever is less): 859       Recertification will be due (POC Duration  / 90 days whichever is less): 2023      Visit # Insurance Allowable Auth Required   In-person 4 40 []  Yes [x]  No        Functional Scale: LEFI = 17/80 = 78% Deficit Date assessed:  3/2/2023       Number of Comorbidities:  [x]0     []1-2    []3+    Latex Allergy:  [x]NO      []YES  Preferred Language for Healthcare:   [x]English       []other:      Pain level:  1-2/10     SUBJECTIVE:  Patient reports no major changes since last visit. Has ambulated around the house without crutches. Has done some stairs.      OBJECTIVE:   Observation: Ambulation with 1 crutch   Test measurements:  PROM Hip flexion to 120 without any discomfort     RESTRICTIONS/PRECAUTIONS: WBAT, No extension past neutral - DOS = 2023    Exercises/Interventions:     Therapeutic Ex (11016)/NMR re-education (45598) Sets/Reps Notes/CUES   Bike 6'       Bridges 3x10 Feet elevated   S/L hip ABD 3x10    Long arc quad  3x12 L     Hamstring curl

## 2023-03-24 ENCOUNTER — OFFICE VISIT (OUTPATIENT)
Dept: ORTHOPEDIC SURGERY | Age: 38
End: 2023-03-24

## 2023-03-24 VITALS — BODY MASS INDEX: 24.11 KG/M2 | WEIGHT: 178 LBS | HEIGHT: 72 IN

## 2023-03-24 DIAGNOSIS — S73.192A TEAR OF LEFT ACETABULAR LABRUM, INITIAL ENCOUNTER: Primary | ICD-10-CM

## 2023-03-24 PROCEDURE — 99024 POSTOP FOLLOW-UP VISIT: CPT | Performed by: ORTHOPAEDIC SURGERY

## 2023-03-24 NOTE — PROGRESS NOTES
Dr Morales Baeza      Date /Time 3/24/2023       9:41 AM EST  Name Yadi Hernandez             1985   Location  [address] [place] [address]  MRN [id number]                No chief complaint on file. History of Present Illness      Yadi Hernandez is a 44 y.o. female is here for post-op visit after LEFT        Patient is 6 weeks status post left hip arthroscopic labral repair. Patient doing well. Pain controlled. Patient denies any fever or chills    Physical Exam    Based off 1997 Exam Criteria    There were no vitals taken for this visit. Constitutional:       General: He is not in acute distress. Appearance: Normal appearance. LEFT Hip: incision clean, intact, healing appropriately. No surrounding  erythema or fluctuance. Neuro intact distal. No evidence of DVT. Imaging             Assessment and Plan    Stephon Rodarte was seen today for post-op check. Diagnoses and all orders for this visit:    Tear of left acetabular labrum, initial encounter        Patient is doing well. She can advance to weightbearing as tolerated at this time. She will continue physical therapy along the protocol. We will see her back in 8 weeks or sooner if problems arise. Please take x-rays prior to visit at next visit    Electronically signed by Morales Baeza MD on 3/24/2023 at 9:26 AM  This dictation was generated by voice recognition computer software. Although all attempts are made to edit the dictation for accuracy, there may be errors in the transcription that are not intended.

## 2023-03-27 ENCOUNTER — HOSPITAL ENCOUNTER (OUTPATIENT)
Dept: PHYSICAL THERAPY | Age: 38
Setting detail: THERAPIES SERIES
Discharge: HOME | End: 2023-03-27
Payer: COMMERCIAL

## 2023-03-27 PROCEDURE — 97140 MANUAL THERAPY 1/> REGIONS: CPT

## 2023-03-27 PROCEDURE — 97110 THERAPEUTIC EXERCISES: CPT

## 2023-03-27 NOTE — FLOWSHEET NOTE
Reviewed/Progressed HEP activities related to strengthening, flexibility, endurance, ROM of core, proximal hip and LE for functional self-care, mobility, lifting and ambulation/stair navigation   [] (71500)Reviewed/Progressed HEP activities related to improving balance, coordination, kinesthetic sense, posture, motor skill, proprioception of core, proximal hip and LE for self care, mobility, lifting, and ambulation/stair navigation      Manual Treatments:  PROM / STM / Oscillations-Mobs:  G-I, II, III, IV (PA's, Inf., Post.)  [x] (10231) Provided manual therapy to mobilize LE, proximal hip and/or LS spine soft tissue/joints for the purpose of modulating pain, promoting relaxation,  increasing ROM, reducing/eliminating soft tissue swelling/inflammation/restriction, improving soft tissue extensibility and allowing for proper ROM for normal function with self care, mobility, lifting and ambulation. Modalities:     [] GAME READY (VASO)- for significant edema, swelling, pain control. Charges  Timed Code Treatment Minutes: 55   Total Treatment Minutes: 65 (Ice)     [] EVAL (LOW) 57158   [] EVAL (MOD) 68102  [] EVAL (HIGH) 59701   [] RE-EVAL     [x] KH(04641) x 3    [] IONTO  [] NMR (09258) x     [] VASO  [x] Manual (05325) x 1     [] Other:  [] TA x      [] Mech Traction (49443)  [] ES(attended) (37189)      [] ES (un) (77269):       GOALS:   Patient stated goal: Return to pickle ball   [] Progressing: [] Met: [] Not Met: [] Adjusted     Therapist goals for Patient:   Short Term Goals: To be achieved in: 2 weeks  1. Independent in HEP and progression per patient tolerance, in order to prevent re-injury. [] Progressing: [] Met: [] Not Met: [] Adjusted  2. Patient will have a decrease in pain to facilitate improvement in movement, function, and ADLs as indicated by Functional Deficits. [] Progressing: [] Met: [] Not Met: [] Adjusted     Long Term Goals: To be achieved in: 12 weeks  1.  LEFI will improve by at

## 2023-03-28 ENCOUNTER — APPOINTMENT (OUTPATIENT)
Dept: PHYSICAL THERAPY | Age: 38
End: 2023-03-28
Payer: COMMERCIAL

## 2023-03-30 ENCOUNTER — HOSPITAL ENCOUNTER (OUTPATIENT)
Dept: PHYSICAL THERAPY | Age: 38
Setting detail: THERAPIES SERIES
Discharge: HOME | End: 2023-03-30
Payer: COMMERCIAL

## 2023-03-30 PROCEDURE — 97110 THERAPEUTIC EXERCISES: CPT

## 2023-03-30 NOTE — FLOWSHEET NOTE
The [facility] and [address],  Sports Performance and Rehabilitation, [facility] [address] [place] [address]                  793 [facility],5Th Floor        Ivis Viramontes  Phone: [phone]  Fax: [phone]    Physical Therapy Treatment Note/ Progress Report:           Date:  3/30/2023    Patient Name:  Ruperto Chapa    :  1985  MRN: [id number]  Restrictions/Precautions:    Medical/Treatment Diagnosis Information:  Tear of left acetabular labrum, initial encounter [S73.192A]  PT Diagnosis: L Hip Pain [Z55.598]     Insurance/Certification information:  [facility]  Physician Information:  Debbie Way MD  Has the plan of care been signed (Y/N):        [x]  Yes  []  No     Date of Patient follow up with Physician: 3/24/2023      Is this a Progress Report:     []  Yes  [x]  No        If Yes:  Date Range for reporting period:  Beginning: 3/2/2023  Ending    Progress report will be due (10 Rx or 30 days whichever is less): 3557       Recertification will be due (POC Duration  / 90 days whichever is less): 2023      Visit # Insurance Allowable Auth Required   In-person 6 40 []  Yes [x]  No        Functional Scale: LEFI = 17/80 = 78% Deficit Date assessed:  3/2/2023             LEFI  = NV      Number of Comorbidities:  [x]0     []1-2    []3+    Latex Allergy:  [x]NO      []YES  Preferred Language for Healthcare:   [x]English       []other:      Pain level:  0/10     SUBJECTIVE:  Patient just some soreness in her calves from her raises last visit. No pain in the hip as she is increasing her walking tolerance.      OBJECTIVE:   Observation: Ambulation with 1 crutch   Test measurements:  PROM Hip flexion to 120 without any discomfort     RESTRICTIONS/PRECAUTIONS: WBAT, No extension past neutral - DOS = 2023    Exercises/Interventions:     Therapeutic Ex (96499)/NMR re-education (13738) Sets/Reps Notes/CUES   Bike 10' Upright bike level 9      Bridges 3x10 BOSU elevated   S/L hip ABD

## 2023-04-04 ENCOUNTER — HOSPITAL ENCOUNTER (OUTPATIENT)
Dept: PHYSICAL THERAPY | Age: 38
Setting detail: THERAPIES SERIES
Discharge: HOME | End: 2023-04-04
Payer: COMMERCIAL

## 2023-04-04 PROCEDURE — 97110 THERAPEUTIC EXERCISES: CPT

## 2023-04-04 NOTE — FLOWSHEET NOTE
and ambulation/stair navigation   [] (37577)Reviewed/Progressed HEP activities related to improving balance, coordination, kinesthetic sense, posture, motor skill, proprioception of core, proximal hip and LE for self care, mobility, lifting, and ambulation/stair navigation      Manual Treatments:  PROM / STM / Oscillations-Mobs:  G-I, II, III, IV (PA's, Inf., Post.)  [x] (00501) Provided manual therapy to mobilize LE, proximal hip and/or LS spine soft tissue/joints for the purpose of modulating pain, promoting relaxation,  increasing ROM, reducing/eliminating soft tissue swelling/inflammation/restriction, improving soft tissue extensibility and allowing for proper ROM for normal function with self care, mobility, lifting and ambulation. Modalities:     [] GAME READY (VASO)- for significant edema, swelling, pain control. Charges  Timed Code Treatment Minutes: 55   Total Treatment Minutes: 55      [] EVAL (LOW) 67721   [] EVAL (MOD) 00348  [] EVAL (HIGH) 59439   [] RE-EVAL     [x] NP(39494) x 4    [] IONTO  [] NMR (61808) x     [] VASO  [] Manual (45094) x      [] Other:  [] TA x      [] Mech Traction (49876)  [] ES(attended) (21239)      [] ES (un) (81930):       GOALS:   Patient stated goal: Return to pickle ball   [] Progressing: [] Met: [] Not Met: [] Adjusted     Therapist goals for Patient:   Short Term Goals: To be achieved in: 2 weeks  1. Independent in HEP and progression per patient tolerance, in order to prevent re-injury. [] Progressing: [] Met: [] Not Met: [] Adjusted  2. Patient will have a decrease in pain to facilitate improvement in movement, function, and ADLs as indicated by Functional Deficits. [] Progressing: [] Met: [] Not Met: [] Adjusted     Long Term Goals: To be achieved in: 12 weeks  1. LEFI will improve by at least 9 points to reach [address] [facility] [address] and improve function. [] Progressing: [] Met: [] Not Met: [] Adjusted  2.  Patient will demonstrate increased AROM to 120 degrees of hip flexion

## 2023-04-06 ENCOUNTER — HOSPITAL ENCOUNTER (OUTPATIENT)
Dept: PHYSICAL THERAPY | Age: 38
Setting detail: THERAPIES SERIES
Discharge: HOME | End: 2023-04-06
Payer: COMMERCIAL

## 2023-04-06 PROCEDURE — 97110 THERAPEUTIC EXERCISES: CPT

## 2023-04-06 NOTE — FLOWSHEET NOTE
and ambulation/stair navigation   [] (74529)Reviewed/Progressed HEP activities related to improving balance, coordination, kinesthetic sense, posture, motor skill, proprioception of core, proximal hip and LE for self care, mobility, lifting, and ambulation/stair navigation      Manual Treatments:  PROM / STM / Oscillations-Mobs:  G-I, II, III, IV (PA's, Inf., Post.)  [x] (28801) Provided manual therapy to mobilize LE, proximal hip and/or LS spine soft tissue/joints for the purpose of modulating pain, promoting relaxation,  increasing ROM, reducing/eliminating soft tissue swelling/inflammation/restriction, improving soft tissue extensibility and allowing for proper ROM for normal function with self care, mobility, lifting and ambulation. Modalities:     [] GAME READY (VASO)- for significant edema, swelling, pain control. Charges  Timed Code Treatment Minutes: 55   Total Treatment Minutes: 55      [] EVAL (LOW) 00434   [] EVAL (MOD) 23270  [] EVAL (HIGH) 10935   [] RE-EVAL     [x] EC(45899) x 4    [] IONTO  [] NMR (70941) x     [] VASO  [] Manual (05575) x      [] Other:  [] TA x      [] Mech Traction (82429)  [] ES(attended) (51563)      [] ES (un) (96508):       GOALS:   Patient stated goal: Return to pickle ball   [] Progressing: [] Met: [] Not Met: [] Adjusted     Therapist goals for Patient:   Short Term Goals: To be achieved in: 2 weeks  1. Independent in HEP and progression per patient tolerance, in order to prevent re-injury. [] Progressing: [] Met: [] Not Met: [] Adjusted  2. Patient will have a decrease in pain to facilitate improvement in movement, function, and ADLs as indicated by Functional Deficits. [] Progressing: [] Met: [] Not Met: [] Adjusted     Long Term Goals: To be achieved in: 12 weeks  1. LEFI will improve by at least 9 points to reach [facility] [address] and improve function. [] Progressing: [] Met: [] Not Met: [] Adjusted  2.  Patient will demonstrate increased AROM to 120 degrees of hip flexion

## 2023-04-18 ENCOUNTER — HOSPITAL ENCOUNTER (OUTPATIENT)
Dept: PHYSICAL THERAPY | Age: 38
Setting detail: THERAPIES SERIES
Discharge: HOME | End: 2023-04-18
Payer: COMMERCIAL

## 2023-04-18 PROCEDURE — 97110 THERAPEUTIC EXERCISES: CPT

## 2023-04-18 NOTE — FLOWSHEET NOTE
eccentric body weight control with ambulation re-education including up and down stairs     Home Exercise Program:    [x] (90923) Reviewed/Progressed HEP activities related to strengthening, flexibility, endurance, ROM of core, proximal hip and LE for functional self-care, mobility, lifting and ambulation/stair navigation   [] (51182)Reviewed/Progressed HEP activities related to improving balance, coordination, kinesthetic sense, posture, motor skill, proprioception of core, proximal hip and LE for self care, mobility, lifting, and ambulation/stair navigation      Manual Treatments:  PROM / STM / Oscillations-Mobs:  G-I, II, III, IV (PA's, Inf., Post.)  [x] (30492) Provided manual therapy to mobilize LE, proximal hip and/or LS spine soft tissue/joints for the purpose of modulating pain, promoting relaxation,  increasing ROM, reducing/eliminating soft tissue swelling/inflammation/restriction, improving soft tissue extensibility and allowing for proper ROM for normal function with self care, mobility, lifting and ambulation. Modalities:     [] GAME READY (VASO)- for significant edema, swelling, pain control. Charges  Timed Code Treatment Minutes: 55   Total Treatment Minutes: 55      [] EVAL (LOW) 34328   [] EVAL (MOD) 16094  [] EVAL (HIGH) 62868   [] RE-EVAL     [x] ZA(05591) x 4    [] IONTO  [] NMR (83557) x     [] VASO  [] Manual (07077) x      [] Other:  [] TA x      [] Mech Traction (65317)  [] ES(attended) (71480)      [] ES (un) (82246):       GOALS:   Patient stated goal: Return to pickle ball   [] Progressing: [] Met: [] Not Met: [] Adjusted     Therapist goals for Patient:   Short Term Goals: To be achieved in: 2 weeks  1. Independent in HEP and progression per patient tolerance, in order to prevent re-injury. [] Progressing: [] Met: [] Not Met: [] Adjusted  2. Patient will have a decrease in pain to facilitate improvement in movement, function, and ADLs as indicated by Functional Deficits.   []
Discharge      Electronically signed by:  Jesus Rothman, PT, DPT, SCS    Note: If patient does not return for scheduled/ recommended follow up visits, this note will serve as a discharge from care along with most recent update on progress.

## 2023-04-20 ENCOUNTER — HOSPITAL ENCOUNTER (OUTPATIENT)
Dept: PHYSICAL THERAPY | Age: 38
Setting detail: THERAPIES SERIES
Discharge: HOME | End: 2023-04-20
Payer: COMMERCIAL

## 2023-04-20 PROCEDURE — 97110 THERAPEUTIC EXERCISES: CPT

## 2023-04-20 NOTE — FLOWSHEET NOTE
The [facility] and [address],  Sports Performance and Rehabilitation, [facility] [address] [place] [address]                  793 [facility],5Th Floor        Ivis Viramontes  Phone: [phone]  Fax: [phone]    Physical Therapy Treatment Note/ Progress Report:           Date:  2023    Patient Name:  Natalie Alonso    :  1985  MRN: [id number]  Restrictions/Precautions:    Medical/Treatment Diagnosis Information:  Tear of left acetabular labrum, initial encounter [S73.192A]  PT Diagnosis: L Hip Pain [K83.613]     Insurance/Certification information:  [facility]  Physician Information:  Mir Cox MD  Has the plan of care been signed (Y/N):        [x]  Yes  []  No     Date of Patient follow up with Physician: 2023      Is this a Progress Report:     []  Yes  [x]  No        If Yes:  Date Range for reporting period:  Beginnin2023  Ending     Progress report will be due (10 Rx or 30 days whichever is less):        Recertification will be due (POC Duration  / 90 days whichever is less): 2023      Visit # Insurance Allowable Auth Required   In-person 11 40 []  Yes [x]  No        Functional Scale: LEFI = 17/80 = 78% Deficit Date assessed:  3/2/2023            LEFI  = 36/80 = 55% Deficit        23      Number of Comorbidities:  [x]0     []1-2    []3+    Latex Allergy:  [x]NO      []YES  Preferred Language for Healthcare:   [x]English       []other:      Pain level:  0/10     SUBJECTIVE:  Patient reports some hamstring soreness after last session but nothing otherwise.     OBJECTIVE:   Observation:    Test measurements:  PROM Hip flexion to 120 without any discomfort     RESTRICTIONS/PRECAUTIONS: WBAT, No extension past neutral - DOS = 2023  Initiate plyometrics   Begin RTR     Exercises/Interventions:     Therapeutic Ex (43926)/NMR re-education (10363) Sets/Reps Notes/CUES   Bike 5' Upright bike level 9   FSU  LSU 3x10 R/L  3x10 12\"  6\"   Leg

## 2023-04-25 ENCOUNTER — HOSPITAL ENCOUNTER (OUTPATIENT)
Dept: PHYSICAL THERAPY | Age: 38
Setting detail: THERAPIES SERIES
Discharge: HOME | End: 2023-04-25
Payer: COMMERCIAL

## 2023-04-25 PROCEDURE — 97110 THERAPEUTIC EXERCISES: CPT

## 2023-04-25 NOTE — FLOWSHEET NOTE
[facility] and [address],  Sports Performance and Rehabilitation, [facility] [address]199 [address]                  793 [facility],5Th Floor        Ivis Viramontes  Phone: [phone]  Fax: [phone]    Physical Therapy Treatment Note/ Progress Report:           Date:  2023    Patient Name:  Arelis Johnston    :  1985  MRN: [id number]  Restrictions/Precautions:    Medical/Treatment Diagnosis Information:  Tear of left acetabular labrum, initial encounter [S73.192A]  PT Diagnosis: L Hip Pain [B81.415]     Insurance/Certification information:  [facility]  Physician Information:  Ivy Olivas MD  Has the plan of care been signed (Y/N):        [x]  Yes  []  No     Date of Patient follow up with Physician: 2023      Is this a Progress Report:     []  Yes  [x]  No        If Yes:  Date Range for reporting period:  Beginnin2023  Ending     Progress report will be due (10 Rx or 30 days whichever is less): 8223       Recertification will be due (POC Duration  / 90 days whichever is less): 2023      Visit # Insurance Allowable Auth Required   In-person 12 40 []  Yes [x]  No        Functional Scale: LEFI = 17/80 = 78% Deficit Date assessed:  3/2/2023            LEFI  = 36/80 = 55% Deficit        23      Number of Comorbidities:  [x]0     []1-2    []3+    Latex Allergy:  [x]NO      []YES  Preferred Language for Healthcare:   [x]English       []other:      Pain level:  0/10     SUBJECTIVE:  Patient reports that she was able to do a low impact ride on her peloton over the weekend without any issue.  She does have some light muscle soreness but nothing too bad    OBJECTIVE:   Observation:    Test measurements:  PROM Hip flexion to 120 without any discomfort     RESTRICTIONS/PRECAUTIONS: WBAT, No extension past neutral - DOS = 2023  Initiate plyometrics   Begin RTR     Exercises/Interventions:     Therapeutic Ex (77113)/NMR re-education (06448) Sets/Reps

## 2023-04-27 ENCOUNTER — APPOINTMENT (OUTPATIENT)
Dept: PHYSICAL THERAPY | Age: 38
End: 2023-04-27
Payer: COMMERCIAL

## 2023-05-02 ENCOUNTER — HOSPITAL ENCOUNTER (OUTPATIENT)
Dept: PHYSICAL THERAPY | Age: 38
Setting detail: THERAPIES SERIES
Discharge: HOME | End: 2023-05-02
Payer: COMMERCIAL

## 2023-05-02 PROCEDURE — 97110 THERAPEUTIC EXERCISES: CPT

## 2023-05-02 NOTE — FLOWSHEET NOTE
next week should all continue to progress well. She would continue to benefit from skilled physical therapy to maximize functional outcomes and progress towards goals. PLAN: See eval  [x] Continue per plan of care [] Alter current plan (see comments above)  [] Plan of care initiated [] Hold pending MD visit [] Discharge      Electronically signed by:  Kalyn Lynch, PT, DPT, SCS    Note: If patient does not return for scheduled/ recommended follow up visits, this note will serve as a discharge from care along with most recent update on progress.

## 2023-05-04 ENCOUNTER — HOSPITAL ENCOUNTER (OUTPATIENT)
Dept: PHYSICAL THERAPY | Age: 38
Setting detail: THERAPIES SERIES
Discharge: HOME | End: 2023-05-04
Payer: COMMERCIAL

## 2023-05-04 ENCOUNTER — APPOINTMENT (OUTPATIENT)
Dept: PHYSICAL THERAPY | Age: 38
End: 2023-05-04
Payer: COMMERCIAL

## 2023-05-04 PROCEDURE — 97110 THERAPEUTIC EXERCISES: CPT

## 2023-05-04 NOTE — FLOWSHEET NOTE
The [facility] and [address],  Sports Performance and Rehabilitation, [facility] [address]                  793 [facility],5Th Floor        Ivis Viramontes  Phone: [phone]  Fax: [phone]    Physical Therapy Treatment Note/ Progress Report:           Date:  2023    Patient Name:  Dedra Hooper    :  1985  MRN: [id number]  Restrictions/Precautions:    Medical/Treatment Diagnosis Information:  Tear of left acetabular labrum, initial encounter [S73.192A]  PT Diagnosis: L Hip Pain [I97.422]     Insurance/Certification information:  [facility]  Physician Information:  Sp Greco MD  Has the plan of care been signed (Y/N):        [x]  Yes  []  No     Date of Patient follow up with Physician: 2023      Is this a Progress Report:     []  Yes  [x]  No        If Yes:  Date Range for reporting period:  Beginnin2023  Ending     Progress report will be due (10 Rx or 30 days whichever is less): 3576       Recertification will be due (POC Duration  / 90 days whichever is less): 2023      Visit # Insurance Allowable Auth Required   In-person 14 40 []  Yes [x]  No        Functional Scale: LEFI = 17/80 = 78% Deficit Date assessed:  3/2/2023            LEFI  = 36/80 = 55% Deficit        23      Number of Comorbidities:  [x]0     []1-2    []3+    Latex Allergy:  [x]NO      []YES  Preferred Language for Healthcare:   [x]English       []other:      Pain level:  0/10     SUBJECTIVE:  Patient reports that she is  a little sore today from some increased core work she added in    OBJECTIVE:   Observation:    Test measurements:  PROM Hip flexion to 120 without any discomfort     RESTRICTIONS/PRECAUTIONS: WBAT, No extension past neutral - DOS = 2023  Initiate plyometrics   Begin RTR     Exercises/Interventions:     Therapeutic Ex (16730)/NMR re-education (02152) Sets/Reps Notes/CUES   Bike 5' Upright bike level 9   CC resisted walking X10 ea way 70/50

## 2023-05-09 ENCOUNTER — HOSPITAL ENCOUNTER (OUTPATIENT)
Dept: PHYSICAL THERAPY | Age: 38
Setting detail: THERAPIES SERIES
Discharge: HOME | End: 2023-05-09
Payer: COMMERCIAL

## 2023-05-09 PROCEDURE — 97110 THERAPEUTIC EXERCISES: CPT

## 2023-05-09 NOTE — FLOWSHEET NOTE
[facility] and [address],  Sports Performance and Rehabilitation, [facility] [address]                  793 [facility],5Th Floor        Ivis Viramontes  Phone: [phone]  Fax: [phone]    Physical Therapy Treatment Note/ Progress Report:           Date:  2023    Patient Name:  Stacy Coleman    :  1985  MRN: [id number]  Restrictions/Precautions:    Medical/Treatment Diagnosis Information:  Tear of left acetabular labrum, initial encounter [S73.192A]  PT Diagnosis: L Hip Pain [L86.788]     Insurance/Certification information:  [facility]  Physician Information:  Sharif Espinal MD  Has the plan of care been signed (Y/N):        [x]  Yes  []  No     Date of Patient follow up with Physician: 2023      Is this a Progress Report:     [x]  Yes  []  No        If Yes:  Date Range for reporting period:  Beginnin2023  Ending 23    Progress report will be due (10 Rx or 30 days whichever is less): 6642       Recertification will be due (POC Duration  / 90 days whichever is less): 2023      Visit # Insurance Allowable Auth Required   In-person 15 40 []  Yes [x]  No        Functional Scale: LEFI = 17/80 = 78% Deficit Date assessed:  3/2/2023            LEFI  = 36/80 = 55% Deficit        23            LEFI = NV          23     Number of Comorbidities:  [x]0     []1-2    []3+    Latex Allergy:  [x]NO      []YES  Preferred Language for Healthcare:   [x]English       []other:      Pain level:  0/10     SUBJECTIVE:  Patient reports no issues in the hip today    OBJECTIVE:   Observation:    Test measurements:  PROM Hip flexion to 120 without any discomfort     RESTRICTIONS/PRECAUTIONS: WBAT, No extension past neutral - DOS = 2023  Initiate plyometrics   Begin RTR     Exercises/Interventions:      Therapeutic Ex (59840)/NMR re-education (67786) Sets/Reps Notes/CUES   Bike 5' Upright bike level 9   CC resisted walking X10 ea way 70/50

## 2023-05-11 ENCOUNTER — HOSPITAL ENCOUNTER (OUTPATIENT)
Dept: PHYSICAL THERAPY | Age: 38
Setting detail: THERAPIES SERIES
Discharge: HOME | End: 2023-05-11
Payer: COMMERCIAL

## 2023-05-11 ENCOUNTER — APPOINTMENT (OUTPATIENT)
Dept: PHYSICAL THERAPY | Age: 38
End: 2023-05-11
Payer: COMMERCIAL

## 2023-05-11 PROCEDURE — 97110 THERAPEUTIC EXERCISES: CPT

## 2023-05-11 NOTE — FLOWSHEET NOTE
care [] Alter current plan (see comments above)  [] Plan of care initiated [] Hold pending MD visit [] Discharge      Electronically signed by:  Rachana Fontaine PT, DPT, SCS    Note: If patient does not return for scheduled/ recommended follow up visits, this note will serve as a discharge from care along with most recent update on progress.

## 2023-05-16 ENCOUNTER — HOSPITAL ENCOUNTER (OUTPATIENT)
Dept: PHYSICAL THERAPY | Age: 38
Setting detail: THERAPIES SERIES
Discharge: HOME | End: 2023-05-16
Payer: COMMERCIAL

## 2023-05-16 PROCEDURE — 97110 THERAPEUTIC EXERCISES: CPT

## 2023-05-16 NOTE — FLOWSHEET NOTE
[facility] and [address],  Sports Performance and Rehabilitation, [facility] [address]                  793 [facility],5Th Floor        Ivis Viramontes  Phone: [phone]  Fax: [phone]    Physical Therapy Treatment Note/ Progress Report:           Date:  2023    Patient Name:  Jim Joiner    :  1985  MRN: [id number]  Restrictions/Precautions:    Medical/Treatment Diagnosis Information:  Tear of left acetabular labrum, initial encounter [S73.192A]  PT Diagnosis: L Hip Pain [H77.641]     Insurance/Certification information:  [facility]  Physician Information:  José Miguel Ford MD  Has the plan of care been signed (Y/N):        [x]  Yes  []  No     Date of Patient follow up with Physician: 2023      Is this a Progress Report:     []  Yes  [x]  No        If Yes:  Date Range for reporting period:  Beginnin2023  Ending     Progress report will be due (10 Rx or 30 days whichever is less): 8266       Recertification will be due (POC Duration  / 90 days whichever is less): 2023      Visit # Insurance Allowable Auth Required   In-person 17 40 []  Yes [x]  No        Functional Scale: LEFI = 17/80 = 78% Deficit Date assessed:  3/2/2023            LEFI  = 36/80 = 55% Deficit        23            LEFI = 55/80 = 69% Deficit        23     Number of Comorbidities:  [x]0     []1-2    []3+    Latex Allergy:  [x]NO      []YES  Preferred Language for Healthcare:   [x]English       []other:      Pain level:  0/10     SUBJECTIVE:  Patient reports no issues in the hip today    OBJECTIVE:   Observation:    Test measurements:  PROM Hip flexion to 120 without any discomfort     RESTRICTIONS/PRECAUTIONS: WBAT, No extension past neutral - DOS = 2023  Initiate plyometrics   Begin RTR     Exercises/Interventions:      Therapeutic Ex (07676)/NMR re-education (01249) Sets/Reps Notes/CUES   Bike 5' Upright bike level 9   CC resisted walking X10 ea way

## 2023-05-18 ENCOUNTER — HOSPITAL ENCOUNTER (OUTPATIENT)
Dept: PHYSICAL THERAPY | Age: 38
Setting detail: THERAPIES SERIES
Discharge: HOME | End: 2023-05-18
Payer: COMMERCIAL

## 2023-05-18 PROCEDURE — 97110 THERAPEUTIC EXERCISES: CPT

## 2023-05-18 NOTE — FLOWSHEET NOTE
[facility] and [address],  Sports Performance and Rehabilitation, [facility] [address] [place] [address]                  793 [facility],5Th Floor        Ivis Viramontes  Phone: [phone]  Fax: [phone]    Physical Therapy Treatment Note/ Progress Report:           Date:  2023    Patient Name:  Yael Ch    :  1985  MRN: [id number]  Restrictions/Precautions:    Medical/Treatment Diagnosis Information:  Tear of left acetabular labrum, initial encounter [S73.192A]  PT Diagnosis: L Hip Pain [X06.531]     Insurance/Certification information:  [facility]  Physician Information:  Bhavani Navas MD  Has the plan of care been signed (Y/N):        [x]  Yes  []  No     Date of Patient follow up with Physician: 2023      Is this a Progress Report:     []  Yes  [x]  No        If Yes:  Date Range for reporting period:  Beginnin2023  Ending     Progress report will be due (10 Rx or 30 days whichever is less): 2440       Recertification will be due (POC Duration  / 90 days whichever is less): 2023      Visit # Insurance Allowable Auth Required   In-person 17 40 []  Yes [x]  No        Functional Scale: LEFI = 17/80 = 78% Deficit Date assessed:  3/2/2023            LEFI  = 36/80 = 55% Deficit        23            LEFI = 55/80 = 31% Deficit        23     Number of Comorbidities:  [x]0     []1-2    []3+    Latex Allergy:  [x]NO      []YES  Preferred Language for Healthcare:   [x]English       []other:      Pain level:  0/10     SUBJECTIVE:  Patient reports no issues in the hip today    OBJECTIVE:   Observation:    Test measurements:  PROM Hip flexion to 120 without any discomfort     RESTRICTIONS/PRECAUTIONS: WBAT, No extension past neutral - DOS = 2023  Initiate plyometrics   Begin RTR     Exercises/Interventions:      Therapeutic Ex (77122)/NMR re-education (68769) Sets/Reps Notes/CUES   Bike 5' Upright bike level 9   CC resisted walking X10 ea way

## 2023-05-19 ENCOUNTER — OFFICE VISIT (OUTPATIENT)
Dept: ORTHOPEDIC SURGERY | Age: 38
End: 2023-05-19

## 2023-05-19 DIAGNOSIS — Z98.890 STATUS POST HIP SURGERY: Primary | ICD-10-CM

## 2023-05-19 NOTE — PROGRESS NOTES
computer software. Although all attempts are made to edit the dictation for accuracy, there may be errors in the transcription that are not intended.

## 2023-05-23 ENCOUNTER — HOSPITAL ENCOUNTER (OUTPATIENT)
Dept: PHYSICAL THERAPY | Age: 38
Setting detail: THERAPIES SERIES
Discharge: HOME | End: 2023-05-23
Payer: COMMERCIAL

## 2023-05-23 PROCEDURE — 97110 THERAPEUTIC EXERCISES: CPT

## 2023-05-23 NOTE — FLOWSHEET NOTE
ADLs  [] (52477) Gait Re-education- Provided training and instruction to the patient for proper LE, core and proximal hip recruitment and positioning and eccentric body weight control with ambulation re-education including up and down stairs     Home Exercise Program:    [x] (24324) Reviewed/Progressed HEP activities related to strengthening, flexibility, endurance, ROM of core, proximal hip and LE for functional self-care, mobility, lifting and ambulation/stair navigation   [] (07158)Reviewed/Progressed HEP activities related to improving balance, coordination, kinesthetic sense, posture, motor skill, proprioception of core, proximal hip and LE for self care, mobility, lifting, and ambulation/stair navigation      Manual Treatments:  PROM / STM / Oscillations-Mobs:  G-I, II, III, IV (PA's, Inf., Post.)  [x] (78334) Provided manual therapy to mobilize LE, proximal hip and/or LS spine soft tissue/joints for the purpose of modulating pain, promoting relaxation,  increasing ROM, reducing/eliminating soft tissue swelling/inflammation/restriction, improving soft tissue extensibility and allowing for proper ROM for normal function with self care, mobility, lifting and ambulation. Modalities:     [] GAME READY (VASO)- for significant edema, swelling, pain control. Charges  Timed Code Treatment Minutes: 55   Total Treatment Minutes: 55      [] EVAL (LOW) 52024   [] EVAL (MOD) 77360  [] EVAL (HIGH) 90921   [] RE-EVAL     [x] UM(02322) x 4    [] IONTO  [] NMR (67261) x     [] VASO  [] Manual (24642) x      [] Other:  [] TA x      [] Mech Traction (23403)  [] ES(attended) (61367)      [] ES (un) (53992):       GOALS:   Patient stated goal: Return to pickle ball   [x] Progressing: [] Met: [] Not Met: [] Adjusted     Therapist goals for Patient:   Short Term Goals: To be achieved in: 2 weeks  1. Independent in HEP and progression per patient tolerance, in order to prevent re-injury.    [] Progressing: [x] Met: [] Not Met:

## 2023-05-25 ENCOUNTER — HOSPITAL ENCOUNTER (OUTPATIENT)
Dept: PHYSICAL THERAPY | Age: 38
Setting detail: THERAPIES SERIES
Discharge: HOME | End: 2023-05-25
Payer: COMMERCIAL

## 2023-05-25 PROCEDURE — 97110 THERAPEUTIC EXERCISES: CPT

## 2023-05-25 NOTE — FLOWSHEET NOTE
[facility] and [address],  Sports Performance and Rehabilitation, [facility] [address]                  793 [facility],5Th Floor        Ivis Viramontes  Phone: [phone]  Fax: [phone]    Physical Therapy Treatment Note/ Progress Report:           Date:  2023    Patient Name:  Kory Varela    :  1985  MRN: [id number]  Restrictions/Precautions:    Medical/Treatment Diagnosis Information:  Tear of left acetabular labrum, initial encounter [S73.192A]  PT Diagnosis: L Hip Pain [G62.774]     Insurance/Certification information:  [facility]  Physician Information:  Sonny Cottrell MD  Has the plan of care been signed (Y/N):        [x]  Yes  []  No     Date of Patient follow up with Physician: 2023      Is this a Progress Report:     []  Yes  [x]  No        If Yes:  Date Range for reporting period:  Beginnin2023  Ending     Progress report will be due (10 Rx or 30 days whichever is less): 641       Recertification will be due (POC Duration  / 90 days whichever is less): 2023      Visit # Insurance Allowable Auth Required   In-person 19 40 []  Yes [x]  No        Functional Scale: LEFI = 17/80 = 78% Deficit Date assessed:  3/2/2023            LEFI  = 36/80 = 55% Deficit        23            LEFI = 55/80 = 31% Deficit        23     Number of Comorbidities:  [x]0     []1-2    []3+    Latex Allergy:  [x]NO      []YES  Preferred Language for Healthcare:   [x]English       []other:      Pain level:  0/10     SUBJECTIVE:  Patient reports no issues in the hip today    OBJECTIVE:   Observation:    Test measurements:  PROM Hip flexion to 120 without any discomfort     RESTRICTIONS/PRECAUTIONS: WBAT, No extension past neutral - DOS = 2023  Progress to flat ground running     Exercises/Interventions:      Therapeutic Ex (84802)/NMR re-education (15832) Sets/Reps Notes/CUES   Bike 5' Upright bike level 9   CC resisted walking X10 ea way 80/50

## 2023-06-01 ENCOUNTER — HOSPITAL ENCOUNTER (OUTPATIENT)
Dept: PHYSICAL THERAPY | Age: 38
Setting detail: THERAPIES SERIES
Discharge: HOME | End: 2023-06-01
Payer: COMMERCIAL

## 2023-06-01 PROCEDURE — 97110 THERAPEUTIC EXERCISES: CPT

## 2023-06-01 NOTE — FLOWSHEET NOTE
The [facility] and [address],  Sports Performance and Rehabilitation, [facility] [address]                  793 [facility],5Th Floor        Ivis Viramontes  Phone: [phone]  Fax: [phone]    Physical Therapy Treatment Note/ Progress Report:           Date:  2023    Patient Name:  Tima Stone    :  1985  MRN: [id number]  Restrictions/Precautions:    Medical/Treatment Diagnosis Information:  Tear of left acetabular labrum, initial encounter [S73.192A]  PT Diagnosis: L Hip Pain [M92.335]     Insurance/Certification information:  [facility]  Physician Information:  Edwin Shay MD  Has the plan of care been signed (Y/N):        [x]  Yes  []  No     Date of Patient follow up with Physician: 2023      Is this a Progress Report:     []  Yes  [x]  No        If Yes:  Date Range for reporting period:  Beginnin2023  Ending     Progress report will be due (10 Rx or 30 days whichever is less): 0/3/9480       Recertification will be due (POC Duration  / 90 days whichever is less): 2023      Visit # Insurance Allowable Auth Required   In-person 20 40 []  Yes [x]  No        Functional Scale: LEFI = 17/80 = 78% Deficit Date assessed:  3/2/2023            LEFI  = 36/80 = 55% Deficit        23            LEFI = 55/80 = 31% Deficit        23     Number of Comorbidities:  [x]0     []1-2    []3+    Latex Allergy:  [x]NO      []YES  Preferred Language for Healthcare:   [x]English       []other:      Pain level:  0/10     SUBJECTIVE:  Patient reports no issues in the hip today    OBJECTIVE:   Observation:    Test measurements:  PROM Hip flexion to 120 without any discomfort     RESTRICTIONS/PRECAUTIONS: WBAT, No extension past neutral - DOS = 2023  Progress to flat ground running     Exercises/Interventions:      Therapeutic Ex (19367)/NMR re-education (60940) Sets/Reps Notes/CUES   Bike 5' Upright bike level 9   CC resisted walking X10 ea way 80/50

## 2023-06-06 ENCOUNTER — HOSPITAL ENCOUNTER (OUTPATIENT)
Dept: PHYSICAL THERAPY | Age: 38
Discharge: HOME | End: 2023-06-06

## 2023-06-06 PROCEDURE — 9990000029 HC GAP PACKAGE

## 2023-06-06 NOTE — FLOWSHEET NOTE
[place] [address] and Medicine, [address]  Phone: [phone]  Fax [phone]                                            Lower Extremity Daily Performance Training Note  Date:  2023    Patient Name:  Cassie Mai    :  1985  MRN: [id number]  Restrictions/Precautions:   Medical/Treatment Diagnosis Information:    L Hip Labral Reconstruction 2023   Workout - Tennis - Pickleball  No gym - Peloton    Physician Information:   Odalis ISABEL    Visit Number:  paid 215 on 2023  Billed 215 on 2023    Subjective: Pt states that overall the hip feels good. Objective:  Observation: AROM WFL Globally, IR Decreased      Exercises:  Exercise/Equipment 2023   Upright Bike Seat 9 showing 3'   HS Mobs 3D X5 each   Calf Hip Flex Mobs 3D X5 each   Lateral Walk with TB Red 1 lap       Ladders Forward Diagonal W / each   2in1  1in1 / 1/ each       Lateral Shuffle /   [place] /       Bilat Squat 3D X5 each   SL Squat 3D X5 each       Step matrix X5 Each       Post Step Downs 3D 4\" x5 each                     Other Therapeutic Activities:     Home Exercise Program:     Patient Education:          Assessment:  [] Patient tolerated treatment well [] Patient limited by fatigue  [] Patient limited by pain  [] Patient limited by other medical complications  [] Other:     Prognosis: [] Good [] Fair  [] Poor    Patient Requires Follow-up: [] Yes  [] No    Plan:   [] Continue per plan of care [] Alter current plan (see comments)  [] Plan of care initiated [] Hold pending MD visit [] Discharge    Electronically signed by:  RHETT Moran ATC     Tx was performed by ATC as a part of the [facility] program following PT's recommendations/guidance.        Cosigned by:

## 2023-06-08 ENCOUNTER — HOSPITAL ENCOUNTER (OUTPATIENT)
Dept: PHYSICAL THERAPY | Age: 38
Setting detail: THERAPIES SERIES
Discharge: HOME | End: 2023-06-08
Payer: COMMERCIAL

## 2023-06-08 PROCEDURE — 97110 THERAPEUTIC EXERCISES: CPT

## 2023-06-08 NOTE — FLOWSHEET NOTE
Madison Steve    :  1985  MRN: [id number]  Restrictions/Precautions:    Medical/Treatment Diagnosis Information:  Tear of left acetabular labrum, initial encounter [S73.192A]  PT Diagnosis: L Hip Pain [G50.481]     Insurance/Certification information:  [facility]  Physician Information:  Paige Hammonds MD  Has the plan of care been signed (Y/N):        [x]  Yes  []  No     Date of Patient follow up with Physician: 2023      Is this a Progress Report:     [x]  Yes  []  No        If Yes:  Date Range for reporting period:  Beginnin2023  Ending 23    Progress report will be due (10 Rx or 30 days whichever is less):        Recertification will be due (POC Duration  / 90 days whichever is less): 2023      Visit # Insurance Allowable Auth Required   In-person 21 40 []  Yes [x]  No        Functional Scale: LEFI = 17/80 = 78% Deficit Date assessed:  3/2/2023            LEFI  = 36/80 = 55% Deficit        23            LEFI = 55/80 = 31% Deficit        23           LEFI = NV/80 = NV% Deficit        23     Number of Comorbidities:  [x]0     []1-2    []3+    Latex Allergy:  [x]NO      []YES  Preferred Language for Healthcare:   [x]English       []other:      Pain level:  0/10     SUBJECTIVE:  Patient reports a little soreness in her hip today after a more intense ride on her peloton where she trialed 15 second spurts of riding out of saddle    OBJECTIVE:   Observation:    Test measurements:  PROM Hip flexion to 120 without any discomfort     RESTRICTIONS/PRECAUTIONS: WBAT, No extension past neutral - DOS = 2023  Progress to flat ground running     Exercises/Interventions:      Therapeutic Ex (05885)/NMR re-education (74133) Sets/Reps Notes/CUES   Bike 5' Upright bike level 9   Plyo FSU  Up and overs 2x15 R/L  3x10 R/L 12\"     Leg Press  Plyo leg press 3x12  3x15 145#  75#   Mobility:  Hip Wipers  [place] Pose  Adductor TTN   X12  3x15\" R/L  x10 R/L    Treadmill workout 3'

## 2023-06-27 ENCOUNTER — HOSPITAL ENCOUNTER (OUTPATIENT)
Dept: PHYSICAL THERAPY | Age: 38
Discharge: HOME | End: 2023-06-27
Payer: COMMERCIAL

## 2023-06-29 ENCOUNTER — APPOINTMENT (OUTPATIENT)
Dept: PHYSICAL THERAPY | Age: 38
End: 2023-06-29
Payer: COMMERCIAL

## 2025-07-17 ENCOUNTER — OFFICE VISIT (OUTPATIENT)
Dept: ORTHOPEDIC SURGERY | Age: 40
End: 2025-07-17
Payer: COMMERCIAL

## 2025-07-17 VITALS — WEIGHT: 178 LBS | HEIGHT: 72 IN | BODY MASS INDEX: 24.11 KG/M2

## 2025-07-17 DIAGNOSIS — M25.851 RIGHT HIP IMPINGEMENT SYNDROME: ICD-10-CM

## 2025-07-17 DIAGNOSIS — R52 PAIN: Primary | ICD-10-CM

## 2025-07-17 DIAGNOSIS — M16.31 OSTEOARTHRITIS RESULTING FROM RIGHT HIP DYSPLASIA: ICD-10-CM

## 2025-07-17 PROCEDURE — 99213 OFFICE O/P EST LOW 20 MIN: CPT | Performed by: ORTHOPAEDIC SURGERY

## 2025-07-17 NOTE — PROGRESS NOTES
Dr Kurt Manzano      Date /Time 7/17/2025       9:22 AM EDT  Name Silva Toussaint             1985   Location  [facility]  MRN [id number]                Chief Complaint   Patient presents with    New Patient      Np Right Hip (Left Hip - Labral Repair 2/28/2023)          History of Present Illness    Silva Toussaint is a 39 y.o. female who presents with  right hip pain.    Sent in consultation by No primary care provider on file., .      Injury Mechanism:  none.  Worker's Comp. & legal issues:   none.  Previous Treatments: Ice, Heat, and NSAIDs    Patient presents office today for new problem.  Patient to chief complaint of right hip pain.  Patient's right hip became painful several months ago without any injury or trauma.  She has had a previous left hip arthroscopy and labral repair on the left hip in 2023.  The left hip is doing well.  She has learned the extensive exercises during her physical therapy and has been performing them on the right hip since her pain began.  Her pain is mostly lateral with some extension into her groin.    Past History  No past medical history on file.  Past Surgical History:   Procedure Laterality Date    HIP SURGERY Left 2/8/2023    LEFT HIP ARTHROSCOPY LABRAL REPAIR performed by Kurt Manzano MD at [facility] OR     No family history on file.  Social History     Tobacco Use    Smoking status: Never    Smokeless tobacco: Never   Substance Use Topics    Alcohol use: Yes     Comment: 1      No current outpatient medications on file prior to visit.     No current facility-administered medications on file prior to visit.        ASCVD 10-YEAR RISK SCORE  The ASCVD Risk score (Dru DK, et al., 2019) failed to calculate for the following reasons:    The systolic blood pressure is missing    Cannot find a previous HDL lab    Cannot find a previous total cholesterol lab   .  Review of Systems  10-point ROS is negative other than HPI.    Physical Exam  Based off 1997 Exam

## 2025-07-29 ENCOUNTER — HOSPITAL ENCOUNTER (OUTPATIENT)
Dept: PHYSICAL THERAPY | Age: 40
Setting detail: THERAPIES SERIES
Discharge: HOME | End: 2025-07-29
Payer: COMMERCIAL

## 2025-07-29 DIAGNOSIS — R53.1 WEAKNESS: ICD-10-CM

## 2025-07-29 DIAGNOSIS — M25.551 RIGHT HIP PAIN: Primary | ICD-10-CM

## 2025-07-29 PROCEDURE — 97110 THERAPEUTIC EXERCISES: CPT | Performed by: PHYSICAL THERAPIST

## 2025-07-29 PROCEDURE — 97161 PT EVAL LOW COMPLEX 20 MIN: CPT | Performed by: PHYSICAL THERAPIST

## 2025-07-29 NOTE — PLAN OF CARE
treatment interventions:    Interventions:  Therapeutic Exercise (98942) including: strength training, ROM, and functional mobility  Therapeutic Activities (87845) including: functional mobility training and education.  Neuromuscular Re-education (37866) activation and proprioception, including postural re-education.    Manual Therapy (32178) as indicated to include: Soft Tissue Mobilization and Dry Needling/IASTM  Modalities as needed that may include: Cryotherapy and Thermal Agents  Patient education on activity modification, progression of HEP, and dry needling information    Plan: POC initiated as per evaluation    Electronically Signed by BURAK Trujillo PT, DPT  Date: 07/29/2025   Note: Portions of this note have been templated and/or copied from initial evaluation, reassessments and prior notes for documentation efficiency.  Note: If patient does not return for scheduled/recommended follow up visits, this note will serve as a discharge from care along with the most recent update on progress.    Ortho Evaluation

## 2025-08-01 ENCOUNTER — HOSPITAL ENCOUNTER (OUTPATIENT)
Dept: PHYSICAL THERAPY | Age: 40
Setting detail: THERAPIES SERIES
Discharge: HOME | End: 2025-08-01
Payer: COMMERCIAL

## 2025-08-01 PROCEDURE — 97110 THERAPEUTIC EXERCISES: CPT | Performed by: PHYSICAL THERAPIST

## 2025-08-01 PROCEDURE — 20560 NDL INSJ W/O NJX 1 OR 2 MUSC: CPT | Performed by: PHYSICAL THERAPIST

## 2025-08-01 NOTE — FLOWSHEET NOTE
of 10% or less for the HOS to assist with reaching prior level of function with activities such as sitting for >15 min.  [] Progressing: [] Met: [] Not Met: [] Adjusted  Patient will demonstrate increased Strength of hip ER to at least 4+/5 throughout without pain to allow for proper functional mobility to enable patient to return to squatting.   [] Progressing: [] Met: [] Not Met: [] Adjusted  Patient will return to tennis without increased symptoms or restriction.   [] Progressing: [] Met: [] Not Met: [] Adjusted  Patient will be able to care for and play with kids without increased symptoms or restriction. (patient specific functional goal)    [] Progressing: [] Met: [] Not Met: [] Adjusted     Overall Progression Towards Functional goals/ Treatment Progress Update:  [] Patient is progressing as expected towards functional goals listed.    [] Progression is slowed due to complexities/Impairments listed.  [] Progression has been slowed due to co-morbidities.  [x] Plan just implemented, too soon (<30days) to assess goals progression   [] Goals require adjustment due to lack of progress  [] Patient is not progressing as expected and requires additional follow up with physician  [] Other:     TREATMENT PLAN     Frequency/Duration: 1-2x/week for 8 weeks for the following treatment interventions:    Interventions:  Therapeutic Exercise (05252) including: strength training, ROM, and functional mobility  Therapeutic Activities (93176) including: functional mobility training and education.  Neuromuscular Re-education (58920) activation and proprioception, including postural re-education.    Manual Therapy (73229) as indicated to include: Soft Tissue Mobilization and Dry Needling/IASTM  Modalities as needed that may include: Cryotherapy and Thermal Agents  Patient education on activity modification, progression of HEP, and dry needling information    Plan: POC initiated as per evaluation    Electronically Signed by BURAK

## 2025-08-04 ENCOUNTER — HOSPITAL ENCOUNTER (OUTPATIENT)
Dept: PHYSICAL THERAPY | Age: 40
Setting detail: THERAPIES SERIES
Discharge: HOME | End: 2025-08-04
Payer: COMMERCIAL

## 2025-08-04 PROCEDURE — 97110 THERAPEUTIC EXERCISES: CPT | Performed by: PHYSICAL THERAPIST

## 2025-08-04 PROCEDURE — 20560 NDL INSJ W/O NJX 1 OR 2 MUSC: CPT | Performed by: PHYSICAL THERAPIST

## 2025-08-06 ENCOUNTER — TELEPHONE (OUTPATIENT)
Dept: ORTHOPEDIC SURGERY | Age: 40
End: 2025-08-06

## 2025-08-06 ENCOUNTER — OFFICE VISIT (OUTPATIENT)
Dept: ORTHOPEDIC SURGERY | Age: 40
End: 2025-08-06
Payer: COMMERCIAL

## 2025-08-06 VITALS — BODY MASS INDEX: 24.11 KG/M2 | WEIGHT: 178 LBS | HEIGHT: 72 IN

## 2025-08-06 DIAGNOSIS — M21.851 HIP DYSPLASIA, ACQUIRED, RIGHT: ICD-10-CM

## 2025-08-06 DIAGNOSIS — M25.851 RIGHT HIP IMPINGEMENT SYNDROME: Primary | ICD-10-CM

## 2025-08-06 PROCEDURE — 99213 OFFICE O/P EST LOW 20 MIN: CPT | Performed by: PHYSICIAN ASSISTANT

## 2025-08-19 ENCOUNTER — OFFICE VISIT (OUTPATIENT)
Dept: ORTHOPEDIC SURGERY | Age: 40
End: 2025-08-19

## 2025-08-19 VITALS — WEIGHT: 178 LBS | BODY MASS INDEX: 24.11 KG/M2 | HEIGHT: 72 IN

## 2025-08-19 DIAGNOSIS — Z13.1 SCREENING FOR DIABETES MELLITUS: ICD-10-CM

## 2025-08-19 DIAGNOSIS — M79.604 PAIN OF RIGHT LOWER EXTREMITY: ICD-10-CM

## 2025-08-19 DIAGNOSIS — Z01.818 PRE-OP TESTING: ICD-10-CM

## 2025-08-19 DIAGNOSIS — M25.851 RIGHT HIP IMPINGEMENT SYNDROME: Primary | ICD-10-CM

## 2025-08-19 DIAGNOSIS — R52 PAIN: ICD-10-CM

## 2025-08-19 DIAGNOSIS — S73.191A TEAR OF RIGHT ACETABULAR LABRUM, INITIAL ENCOUNTER: ICD-10-CM

## 2025-08-20 PROBLEM — S73.191A TEAR OF RIGHT ACETABULAR LABRUM: Status: ACTIVE | Noted: 2025-08-20

## 2025-08-20 PROBLEM — M25.851 FEMOROACETABULAR IMPINGEMENT OF RIGHT HIP: Status: ACTIVE | Noted: 2025-08-20

## (undated) DEVICE — CUFF RESTRN WRST OR ANK 45FT AD FOAM

## (undated) DEVICE — NANOPASS REACH CRESCENT: Brand: NANOPASS

## (undated) DEVICE — MAT FLR W32XL58IN

## (undated) DEVICE — XL AGGRESSIVE PLUS, HIP CUTTER. ARTHROSCOPIC SHAVER BLADE. FOR USE WITH: REF 0375-701-500, 0375-704-500, 0375-708-500. DO NOT USE IF PACKAGE IS DAMAGED, KEEP DRY, KEEP AWAY FROM SUNLIGHT: Brand: FORMULA

## (undated) DEVICE — AMBIENT HIPVAC 50 IFS: Brand: AMBIENT

## (undated) DEVICE — XL, ARTHROSCOPIC SHAVER BLADES, DIAMOND ROUND BUR.  DO NOT RESTERILIZE, DO NOT USE IF PACKAGE IS DAMAGED, KEEP DRY, KEEP AWAY FROM SUNLIGHT: Brand: CROSSBLADE

## (undated) DEVICE — COVER,MAYO STAND,XL,STERILE: Brand: MEDLINE

## (undated) DEVICE — PHOENIX DRILL BIT: Brand: PHOENIX

## (undated) DEVICE — APPLICATOR MEDICATED 26 CC SOLUTION HI LT ORNG CHLORAPREP

## (undated) DEVICE — TRANSPORT CANNULA 8MM LENGTH 7, 8, 9: Brand: TRANSPORT

## (undated) DEVICE — NEEDLE SPNL 20GA L3.5IN YEL HUB S STL REG WALL FIT STYL W/

## (undated) DEVICE — TOWEL,STOP FLAG GOLD N-W: Brand: MEDLINE

## (undated) DEVICE — BANDAGE COBAN 4 IN COMPR W4INXL5YD FOAM COHESIVE QUIK STK SELF ADH SFT

## (undated) DEVICE — TUBING FLD MGMT Y DBL SPIK DUALWAVE

## (undated) DEVICE — TAPE SUT MULTIFILAMENT POLYOLEFIN XBRAID TT 3910900013

## (undated) DEVICE — COTTON UNDERCAST PADDING,CRIMPED FINISH: Brand: WEBRIL

## (undated) DEVICE — XL, HIP 8-FLUTE PEAR BUR. ARTHROSCOPIC SHAVER BLADE. FOR USE WITH: REF 0375-701-500, 0375-704-500, 0375-708-500. DO NOT USE IF PACKAGE IS DAMAGED, KEEP DRY, KEEP AWAY FROM SUNLIGHT: Brand: FORMULA

## (undated) DEVICE — INJECTOR II NEEDLE CARTRIDGE: Brand: INJECTOR

## (undated) DEVICE — GLOVE ORANGE PI 7 1/2   MSG9075

## (undated) DEVICE — SLINGSHOT 70 UP: Brand: SLINGSHOT

## (undated) DEVICE — NANOTACK FLEX DRILL BIT: Brand: NANOTACK FLEX

## (undated) DEVICE — CARBIDE BUR,ROUND CUTTING, 10 FLUTES, LONG, 5MM DIA.: Brand: MICROAIRE®

## (undated) DEVICE — CLEANER,CAUTERY TIP,2X2",STERILE: Brand: MEDLINE

## (undated) DEVICE — SUTURE NONABSORBABLE MONOFILAMENT 2-0 FS 18 IN ETHILON 664H

## (undated) DEVICE — GENERAL: Brand: MEDLINE INDUSTRIES, INC.

## (undated) DEVICE — SAMURAI CURVED BLADE: Brand: SAMURAI

## (undated) DEVICE — PORTAL ENTRY KIT

## (undated) DEVICE — PROBE SERFAS 50 - S SWEEP + XL

## (undated) DEVICE — SPONGE GZ W4XL8IN COT WVN 12 PLY

## (undated) DEVICE — SHEET,DRAPE,53X77,STERILE: Brand: MEDLINE

## (undated) DEVICE — 3M™ STERI-DRAPE™ INSTRUMENT POUCH 1018: Brand: STERI-DRAPE™

## (undated) DEVICE — FLOWPORT II CANNULA WITH OBTURATOR STRYKER 165MM: Brand: FLOWPORT

## (undated) DEVICE — GLOVE ORTHO 7 1/2   MSG9475

## (undated) DEVICE — SUTURE MCRYL SZ 4-0 L27IN ABSRB UD L19MM PS-2 1/2 CIR PRIM Y426H

## (undated) DEVICE — INTENDED FOR TISSUE SEPARATION, AND OTHER PROCEDURES THAT REQUIRE A SHARP SURGICAL BLADE TO PUNCTURE OR CUT.: Brand: BARD-PARKER ® CARBON RIB-BACK BLADES

## (undated) DEVICE — COVER,TABLE,HEAVY DUTY,77"X90",STRL: Brand: MEDLINE

## (undated) DEVICE — UNDERGLOVE SURG SZ 8 BLU LTX FREE SYN POLYISOPRENE POLYMER

## (undated) DEVICE — TUBING PMP L16FT MAIN DISP FOR AR-6400 AR-6475

## (undated) DEVICE — SOLUTION IRRIG 3000ML LAC R FLX CONT

## (undated) DEVICE — C-ARM: Brand: UNBRANDED

## (undated) DEVICE — SUTURE VCRL SZ 2-0 L27IN ABSRB UD L26MM CT-2 1/2 CIR J269H

## (undated) DEVICE — DRESSING,GAUZE,XEROFORM,CURAD,1"X8",ST: Brand: CURAD

## (undated) DEVICE — NEEDLE SPNL L3.5IN PNK HUB S STL REG WALL FIT STYL W/ QNCKE

## (undated) DEVICE — 3M™ TEGADERM™ TRANSPARENT FILM DRESSING FRAME STYLE, 1624W, 2-3/8 IN X 2-3/4 IN (6 CM X 7 CM), 100/CT 4CT/CASE: Brand: 3M™ TEGADERM™

## (undated) DEVICE — TOWEL,OR,DSP,ST,BLUE,DLX,8/PK,10PK/CS: Brand: MEDLINE

## (undated) DEVICE — TUBING, SUCTION, 1/4" X 12', STRAIGHT: Brand: MEDLINE

## (undated) DEVICE — TUBING PMP L6FT CONT WAVE EXTN

## (undated) DEVICE — BLANKET WRM W29.9XL79.1IN UP BODY FORC AIR MISTRAL-AIR

## (undated) DEVICE — 6619 2 PTNT ISO SYS INCISE AREA&LT;(&GT;&&LT;)&GT;P: Brand: STERI-DRAPE™ IOBAN™ 2

## (undated) DEVICE — COVER LT HNDL CAM BLU DISP W/ SURG CTRL

## (undated) DEVICE — PAD,ABDOMINAL,5"X9",ST,LF,25/BX: Brand: MEDLINE INDUSTRIES, INC.